# Patient Record
Sex: MALE | Race: WHITE | NOT HISPANIC OR LATINO | Employment: OTHER | URBAN - NONMETROPOLITAN AREA
[De-identification: names, ages, dates, MRNs, and addresses within clinical notes are randomized per-mention and may not be internally consistent; named-entity substitution may affect disease eponyms.]

---

## 2017-02-03 PROBLEM — I71.40 ABDOMINAL AORTIC ANEURYSM (AAA) WITHOUT RUPTURE (HCC): Status: ACTIVE | Noted: 2017-02-03

## 2018-01-23 ENCOUNTER — TELEPHONE (OUTPATIENT)
Dept: CARDIAC SURGERY | Facility: CLINIC | Age: 78
End: 2018-01-23

## 2018-01-23 NOTE — TELEPHONE ENCOUNTER
PT RECEIVED RECALL LETTER PER DR DENNEY IN [place] FOR THERE YEARLY F/U. PT VERIFIED PHONE NUMBER, ADDRESS, AND INSURANCE 1/23/2018

## 2018-01-24 DIAGNOSIS — I71.40 AAA (ABDOMINAL AORTIC ANEURYSM) WITHOUT RUPTURE (HCC): Primary | ICD-10-CM

## 2019-01-14 ENCOUNTER — TELEPHONE (OUTPATIENT)
Dept: CARDIAC SURGERY | Facility: CLINIC | Age: 79
End: 2019-01-14

## 2019-01-14 DIAGNOSIS — I71.40 ABDOMINAL AORTIC ANEURYSM (AAA) WITHOUT RUPTURE (HCC): Primary | ICD-10-CM

## 2019-04-16 DIAGNOSIS — I71.40 ABDOMINAL AORTIC ANEURYSM (AAA) WITHOUT RUPTURE (HCC): ICD-10-CM

## 2019-04-18 ENCOUNTER — OFFICE VISIT (OUTPATIENT)
Dept: CARDIAC SURGERY | Facility: CLINIC | Age: 79
End: 2019-04-18

## 2019-04-18 DIAGNOSIS — I71.40 ABDOMINAL AORTIC ANEURYSM (AAA) WITHOUT RUPTURE (HCC): Primary | ICD-10-CM

## 2019-04-18 PROCEDURE — 99212 OFFICE O/P EST SF 10 MIN: CPT | Performed by: THORACIC SURGERY (CARDIOTHORACIC VASCULAR SURGERY)

## 2019-04-19 VITALS
DIASTOLIC BLOOD PRESSURE: 55 MMHG | HEIGHT: 73 IN | SYSTOLIC BLOOD PRESSURE: 110 MMHG | BODY MASS INDEX: 33.4 KG/M2 | WEIGHT: 252 LBS | OXYGEN SATURATION: 94 % | HEART RATE: 69 BPM

## 2019-04-19 RX ORDER — GLIPIZIDE 5 MG/1
2.5 TABLET ORAL
COMMUNITY

## 2019-04-19 RX ORDER — ATORVASTATIN CALCIUM 20 MG/1
20 TABLET, FILM COATED ORAL DAILY
COMMUNITY

## 2019-04-19 RX ORDER — DOFETILIDE 0.25 MG/1
250 CAPSULE ORAL 2 TIMES DAILY
COMMUNITY

## 2019-04-19 RX ORDER — TORSEMIDE 20 MG/1
20 TABLET ORAL DAILY
COMMUNITY

## 2019-04-19 RX ORDER — PHENOL 1.4 %
600 AEROSOL, SPRAY (ML) MUCOUS MEMBRANE DAILY
COMMUNITY
End: 2022-06-09

## 2019-04-19 RX ORDER — ASPIRIN 81 MG/1
81 TABLET, CHEWABLE ORAL
COMMUNITY

## 2019-04-19 RX ORDER — PANTOPRAZOLE SODIUM 40 MG/1
40 TABLET, DELAYED RELEASE ORAL DAILY
Status: ON HOLD | COMMUNITY
End: 2021-06-16

## 2019-04-19 RX ORDER — WARFARIN SODIUM 7.5 MG/1
7.5 TABLET ORAL
COMMUNITY

## 2019-04-19 NOTE — PROGRESS NOTES
04/18/2019  Patient Information  Cuauhtemoc Blanchard                                                                                          3 [address] [facility] [place] [address]   1940  'PCP/Referring Physician'  Isaías Thornton MD  [phone]  No ref. provider found    Chief Complaint   Patient presents with   • Aortic Aneurysm     1 year follow-up with results from u/s abdominal aorta       History of Present Illness:  Mr. Blanchard returns today for follow up of his abdominal aortic aneurysm.  Since last visit, he has been doing well.  He denies any abdominal pain or discomfort.  He denies any back pain.  He is on oxygen 24/7.  He is seeing Dr. Thornton on a regular basis.    Patient Active Problem List   Diagnosis   • Aneurysm of abdominal aorta (CMS/HCC)   • Lung nodule   • CAD (coronary artery disease)   • Diabetes mellitus (CMS/HCC)   • Hypertension   • H/O: varicose veins   • Abdominal aortic aneurysm (AAA) without rupture (CMS/HCC)     Past Medical History:   Diagnosis Date   • Aneurysm of abdominal aorta (CMS/HCC)    • Arrhythmia    • Benign neoplasm of skin    • CAD (coronary artery disease)    • Diabetes mellitus (CMS/HCC)    • H/O: varicose veins    • Hypertension    • Lung nodule    • Pneumonia      Past Surgical History:   Procedure Laterality Date   • ABDOMINAL AORTIC ANEURYSM REPAIR WITH ENDOGRAFT  03/28/2014   • CARDIAC VALVE REPLACEMENT     • CHOLECYSTECTOMY     • CORONARY ARTERY BYPASS GRAFT         Current Outpatient Medications:   •  amLODIPine (NORVASC) 5 MG tablet, Take 1 tablet by mouth Daily., Disp: , Rfl:   •  aspirin 81 MG chewable tablet, Chew 81 mg., Disp: , Rfl:   •  atorvastatin (LIPITOR) 20 MG tablet, Take 20 mg by mouth Daily., Disp: , Rfl:   •  calcium carbonate (OS-GLORIA) 600 MG tablet, Take 600 mg by mouth Daily., Disp: , Rfl:   •  carvedilol (COREG) 25 MG tablet, Take 1 tablet by mouth 2 (Two) Times a Day., Disp: , Rfl:   •  DOCUSATE SODIUM PO, Take 100 mg by mouth., Disp: , Rfl:   •   dofetilide (TIKOSYN) 250 MCG capsule, Take 250 mcg by mouth 2 (Two) Times a Day., Disp: , Rfl:   •  finasteride (PROSCAR) 5 MG tablet, Take 1 tablet by mouth Daily., Disp: , Rfl:   •  glipiZIDE (GLUCOTROL) 5 MG tablet, Take 5 mg by mouth 2 (Two) Times a Day Before Meals., Disp: , Rfl:   •  pantoprazole (PROTONIX) 40 MG EC tablet, Take 40 mg by mouth Daily., Disp: , Rfl:   •  potassium chloride (K-DUR) 10 MEQ CR tablet, Take 10 mEq by mouth., Disp: , Rfl:   •  tamsulosin (FLOMAX) 0.4 MG capsule 24 hr capsule, Take 1 capsule by mouth 2 (Two) Times a Day., Disp: , Rfl:   •  torsemide (DEMADEX) 20 MG tablet, Take 20 mg by mouth., Disp: , Rfl:   •  warfarin (COUMADIN) 7.5 MG tablet, Take 7.5 mg by mouth Daily. 7 mg daily except Thursday and - take 8mg., Disp: , Rfl:   •  cholestyramine (QUESTRAN) 4 G packet, , Disp: , Rfl:   •  furosemide (LASIX) 20 MG tablet, Take 20 mg by mouth Daily., Disp: , Rfl:   •  JANTOVEN 4 MG tablet, Take 2 tablets by mouth Daily., Disp: , Rfl:   •  losartan (COZAAR) 50 MG tablet, Take 1 tablet by mouth Daily., Disp: , Rfl:   •  metFORMIN (GLUCOPHAGE) 500 MG tablet, Take 1 tablet by mouth Daily., Disp: , Rfl:   Allergies   Allergen Reactions   • Dabigatran Other (See Comments)     GI Bleed   • Pradaxa [Dabigatran Etexilate Mesylate] Other (See Comments)     GI bleed   • Iodixanol Rash   • Povidone Iodine Rash and Hives     Bleeding      Social History     Socioeconomic History   • Marital status:      Spouse name: Not on file   • Number of children: 3   • Years of education: Not on file   • Highest education level: Not on file   Occupational History   • 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-     Employer: RETIRED   Tobacco Use   • Smoking status: Former Smoker     Packs/day: 0.75     Years: 30.00     Pack years: 22.50     Types: Cigarettes     Last attempt to quit:      Years since quittin.3   • Smokeless tobacco: Never Used   Substance and Sexual Activity   • Alcohol use: No   •  "Drug use: No   Social History Narrative    Lives in [facility] with wife     Family History   Problem Relation Age of Onset   • Coronary artery disease Mother    • Diabetes Mother    • Other Father      Review of Systems   Constitution: Negative for chills, fever, malaise/fatigue, night sweats and weight loss.   HENT: Negative for hearing loss, odynophagia and sore throat.    Cardiovascular: Positive for dyspnea on exertion. Negative for chest pain, leg swelling, orthopnea and palpitations.   Respiratory: Positive for shortness of breath. Negative for cough and hemoptysis.    Endocrine: Negative for cold intolerance, heat intolerance, polydipsia, polyphagia and polyuria.   Hematologic/Lymphatic: Bruises/bleeds easily.   Skin: Negative for itching and rash.   Musculoskeletal: Positive for muscle cramps. Negative for joint pain, joint swelling and myalgias.   Gastrointestinal: Negative for abdominal pain, constipation, diarrhea, hematemesis, hematochezia, melena, nausea and vomiting.   Genitourinary: Positive for bladder incontinence. Negative for dysuria, frequency and hematuria.   Neurological: Positive for light-headedness. Negative for focal weakness, headaches, numbness and seizures.   Psychiatric/Behavioral: Negative for suicidal ideas.   All other systems reviewed and are negative.    Vitals:    04/19/19 0837   BP: 110/55   BP Location: Left arm   Patient Position: Sitting   Pulse: 69   SpO2: 94%   Weight: 114 kg (252 lb)   Height: 185.4 cm (73\")      Physical Exam  NECK:     No masses.  LUNGS:     Decreased in the bases without rales or rhonchi.  CARDIOVASCULAR:   Regular rhythm and rate without murmur, rub or gallop.  There are no carotid bruits.   GI:    Abdomen is soft and nontender.    Assessment/Plan:  Mr. Blanchard appears to be stable.  I have obtained and reviewed his ultrasound.  I concur with his aorta being about 5 cm, which appears to be stable.  We will see him back in one year with an abdominal " ultrasound.    Patient Active Problem List   Diagnosis   • Aneurysm of abdominal aorta (CMS/HCC)   • Lung nodule   • CAD (coronary artery disease)   • Diabetes mellitus (CMS/HCC)   • Hypertension   • H/O: varicose veins   • Abdominal aortic aneurysm (AAA) without rupture (CMS/HCC)     CC: MD Daisy Perry transcribing on behalf of Adam Duarte MD dictating.

## 2020-04-16 ENCOUNTER — TELEPHONE (OUTPATIENT)
Dept: CARDIAC SURGERY | Facility: CLINIC | Age: 80
End: 2020-04-16

## 2021-04-14 ENCOUNTER — TELEPHONE (OUTPATIENT)
Dept: CARDIAC SURGERY | Facility: CLINIC | Age: 81
End: 2021-04-14

## 2021-04-14 DIAGNOSIS — I71.40 ABDOMINAL AORTIC ANEURYSM (AAA) WITHOUT RUPTURE (HCC): Primary | ICD-10-CM

## 2021-04-14 NOTE — TELEPHONE ENCOUNTER
This pt was due for a 1 yr f/u and AAA US with Dr. Duarte last year but cancelled due to COVID-19. He would like to set up a f/u this year. I verified his demo and he stated he is normally seen in [place].

## 2021-05-14 DIAGNOSIS — I71.30 RUPTURED ABDOMINAL AORTIC ANEURYSM (AAA) (HCC): Primary | ICD-10-CM

## 2021-05-18 ENCOUNTER — TELEPHONE (OUTPATIENT)
Dept: CARDIAC SURGERY | Facility: CLINIC | Age: 81
End: 2021-05-18

## 2021-05-18 NOTE — TELEPHONE ENCOUNTER
Maida Mcgregor, Hannah Low  Pt's aneurysm has grown on aortic US s/p EVAR.  Seeing Dr. Duarte on 5/20/21.  He needs to have a CTA abd/pelvis to evaluate for endoleak.  Is there anyway to get that done before his appt?     Thanks,   Maida             Per message 05/12/21

## 2021-05-24 ENCOUNTER — OFFICE VISIT (OUTPATIENT)
Dept: CARDIAC SURGERY | Facility: CLINIC | Age: 81
End: 2021-05-24

## 2021-05-24 VITALS
WEIGHT: 248 LBS | HEIGHT: 72 IN | TEMPERATURE: 97.3 F | DIASTOLIC BLOOD PRESSURE: 70 MMHG | SYSTOLIC BLOOD PRESSURE: 150 MMHG | HEART RATE: 53 BPM | BODY MASS INDEX: 33.59 KG/M2

## 2021-05-24 DIAGNOSIS — Z00.6 EXAMINATION FOR NORMAL COMPARISON FOR CLINICAL RESEARCH: Primary | ICD-10-CM

## 2021-05-24 DIAGNOSIS — I71.40 ABDOMINAL AORTIC ANEURYSM (AAA) WITHOUT RUPTURE (HCC): Primary | ICD-10-CM

## 2021-05-24 PROCEDURE — 99212 OFFICE O/P EST SF 10 MIN: CPT | Performed by: THORACIC SURGERY (CARDIOTHORACIC VASCULAR SURGERY)

## 2021-05-24 RX ORDER — CHOLECALCIFEROL (VITAMIN D3) 125 MCG
CAPSULE ORAL DAILY
COMMUNITY

## 2021-05-24 NOTE — PROGRESS NOTES
05/24/2021  Patient Information  Cuauhtemoc Blanchard                                                                                          [address] [place] [address]   1940  'PCP/Referring Physician'  Isaías Thornton MD  [phone]  No ref. provider found    Chief Complaint   Patient presents with   • Follow-up     FU with CTA Abd / Pelvis for AAA       History of Present Illness:   The patient is an 81-year-old male who returns today for follow-up of an abdominal aortic aneurysm.  Its been approximately a year and a half since I have seen him.  His most recent CT scan reveals his aneurysm has grown from 5.7 to 6.1 cm.  No definite endoleak can be detected by the radiologist.      Patient Active Problem List   Diagnosis   • Aneurysm of abdominal aorta (CMS/HCC)   • Lung nodule   • CAD (coronary artery disease)   • Diabetes mellitus (CMS/HCC)   • Hypertension   • H/O: varicose veins   • Abdominal aortic aneurysm (AAA) without rupture (CMS/HCC)     Past Medical History:   Diagnosis Date   • Aneurysm of abdominal aorta (CMS/HCC)    • Arrhythmia    • Benign neoplasm of skin    • CAD (coronary artery disease)    • Diabetes mellitus (CMS/HCC)    • H/O: varicose veins    • Hypertension    • Lung nodule    • Pneumonia      Past Surgical History:   Procedure Laterality Date   • ABDOMINAL AORTIC ANEURYSM REPAIR WITH ENDOGRAFT  03/28/2014   • CARDIAC VALVE REPLACEMENT     • CHOLECYSTECTOMY     • CORONARY ARTERY BYPASS GRAFT         Current Outpatient Medications:   •  aspirin 81 MG chewable tablet, Chew 81 mg., Disp: , Rfl:   •  atorvastatin (LIPITOR) 20 MG tablet, Take 20 mg by mouth Daily., Disp: , Rfl:   •  carvedilol (COREG) 25 MG tablet, Take 1 tablet by mouth 2 (Two) Times a Day., Disp: , Rfl:   •  Cholecalciferol (Vitamin D3) 50 MCG (2000 UT) tablet, Take  by mouth Daily., Disp: , Rfl:   •  dofetilide (TIKOSYN) 250 MCG capsule, Take 250 mcg by mouth 2 (Two) Times a Day., Disp: , Rfl:   •  finasteride  (PROSCAR) 5 MG tablet, Take 1 tablet by mouth Daily., Disp: , Rfl:   •  glipiZIDE (GLUCOTROL) 5 MG tablet, Take 5 mg by mouth 2 (Two) Times a Day Before Meals., Disp: , Rfl:   •  potassium chloride (K-DUR) 10 MEQ CR tablet, Take 10 mEq by mouth., Disp: , Rfl:   •  torsemide (DEMADEX) 20 MG tablet, Take 20 mg by mouth., Disp: , Rfl:   •  warfarin (COUMADIN) 7.5 MG tablet, Take 7.5 mg by mouth Daily. 7 mg on Monday, and 6 mg the other 6 days, Disp: , Rfl:   •  amLODIPine (NORVASC) 5 MG tablet, Take 1 tablet by mouth Daily., Disp: , Rfl:   •  calcium carbonate (OS-GLORIA) 600 MG tablet, Take 600 mg by mouth Daily., Disp: , Rfl:   •  cholestyramine (QUESTRAN) 4 G packet, , Disp: , Rfl:   •  DOCUSATE SODIUM PO, Take 100 mg by mouth., Disp: , Rfl:   •  furosemide (LASIX) 20 MG tablet, Take 20 mg by mouth Daily., Disp: , Rfl:   •  JANTOVEN 4 MG tablet, Take 2 tablets by mouth Daily., Disp: , Rfl:   •  losartan (COZAAR) 50 MG tablet, Take 1 tablet by mouth Daily., Disp: , Rfl:   •  metFORMIN (GLUCOPHAGE) 500 MG tablet, Take 1 tablet by mouth Daily., Disp: , Rfl:   •  pantoprazole (PROTONIX) 40 MG EC tablet, Take 40 mg by mouth Daily., Disp: , Rfl:   •  tamsulosin (FLOMAX) 0.4 MG capsule 24 hr capsule, Take 1 capsule by mouth 2 (Two) Times a Day., Disp: , Rfl:   Allergies   Allergen Reactions   • Dabigatran Other (See Comments)     GI Bleed   • Pradaxa [Dabigatran Etexilate Mesylate] Other (See Comments)     GI bleed   • Iodixanol Rash   • Povidone Iodine Rash and Hives     Bleeding      Social History     Socioeconomic History   • Marital status:      Spouse name: Not on file   • Number of children: 3   • Years of education: Not on file   • Highest education level: Not on file   Tobacco Use   • Smoking status: Former Smoker     Packs/day: 0.75     Years: 30.00     Pack years: 22.50     Types: Cigarettes     Quit date: 1999     Years since quittin.4   • Smokeless tobacco: Never Used   Substance and Sexual Activity  "  • Alcohol use: No   • Drug use: No     Family History   Problem Relation Age of Onset   • Coronary artery disease Mother    • Diabetes Mother    • Other Father      Review of Systems   Constitutional: Positive for malaise/fatigue. Negative for chills, fever, night sweats and weight loss.   HENT: Negative for hearing loss, odynophagia and sore throat.    Cardiovascular: Positive for dyspnea on exertion and leg swelling. Negative for chest pain, orthopnea and palpitations.   Respiratory: Positive for cough, shortness of breath and wheezing. Negative for hemoptysis.    Endocrine: Negative for cold intolerance, heat intolerance, polydipsia, polyphagia and polyuria.   Hematologic/Lymphatic: Bruises/bleeds easily.   Skin: Negative for itching and rash.   Musculoskeletal: Positive for muscle cramps. Negative for joint pain, joint swelling and myalgias.   Gastrointestinal: Negative for abdominal pain, constipation, diarrhea, hematemesis, hematochezia, melena, nausea and vomiting.   Genitourinary: Negative for dysuria, frequency and hematuria.   Neurological: Positive for dizziness. Negative for focal weakness, headaches, numbness and seizures.   Psychiatric/Behavioral: Negative for suicidal ideas.   All other systems reviewed and are negative.    Vitals:    05/24/21 1106   BP: 150/70   BP Location: Right arm   Patient Position: Sitting   Pulse: 53   Temp: 97.3 °F (36.3 °C)   Weight: 112 kg (248 lb)   Height: 182.9 cm (72\")      Physical Exam  Vitals and nursing note reviewed.   Cardiovascular:      Rate and Rhythm: Normal rate and regular rhythm.      Pulses: Normal pulses.      Heart sounds: Normal heart sounds.   Pulmonary:      Effort: Pulmonary effort is normal.      Breath sounds: Normal breath sounds.   Abdominal:      General: Abdomen is flat. Bowel sounds are normal.      Palpations: Abdomen is soft.   Musculoskeletal:      Cervical back: Normal range of motion and neck supple.         The ROS, past medical " history, surgical history, family history, social history and vitals were reviewed by myself and corrected as needed.      Assessment/Plan:   The patient is an 81-year-old  male who I, previously, performed an endograft in 2014.  The patient has done well and continues to feel well, but the aneurysm has enlarged.  I personally obtained and reviewed the CT scan.  I concur with the interpretation that the aneurysm is now approximately 6.1 cm in diameter. However, I do not see a definite endoleak.  At this point, I have reviewed the case with Dr. Huddleston in detail.  He reviewed the films as well.  We will make a group decision in regards to the plan for the patient.  He may require another arteriogram or attempted intervention on the aneurysm. I discussed that with both the patient and his grandson and answered all of their questions.    Patient Active Problem List   Diagnosis   • Aneurysm of abdominal aorta (CMS/HCC)   • Lung nodule   • CAD (coronary artery disease)   • Diabetes mellitus (CMS/HCC)   • Hypertension   • H/O: varicose veins   • Abdominal aortic aneurysm (AAA) without rupture (CMS/HCC)     CC: MD Natali Merrill editing for Adam Duarte MD      I, Adam Duarte MD, have read and agree with the editing done by Natali Ferrell, .

## 2021-05-25 ENCOUNTER — PREP FOR SURGERY (OUTPATIENT)
Dept: OTHER | Facility: HOSPITAL | Age: 81
End: 2021-05-25

## 2021-05-25 DIAGNOSIS — I97.89 TYPE II ENDOLEAK OF AORTIC GRAFT: Primary | ICD-10-CM

## 2021-05-25 RX ORDER — SODIUM CHLORIDE 9 MG/ML
100 INJECTION, SOLUTION INTRAVENOUS CONTINUOUS
Status: CANCELLED | OUTPATIENT
Start: 2021-05-25

## 2021-05-25 RX ORDER — SODIUM CHLORIDE 0.9 % (FLUSH) 0.9 %
3 SYRINGE (ML) INJECTION EVERY 12 HOURS SCHEDULED
Status: CANCELLED | OUTPATIENT
Start: 2021-05-25

## 2021-05-25 RX ORDER — SODIUM CHLORIDE 0.9 % (FLUSH) 0.9 %
1-10 SYRINGE (ML) INJECTION AS NEEDED
Status: CANCELLED | OUTPATIENT
Start: 2021-05-25

## 2021-06-04 PROBLEM — I97.89 TYPE II ENDOLEAK OF AORTIC GRAFT: Status: ACTIVE | Noted: 2021-06-04

## 2021-06-16 ENCOUNTER — HOSPITAL ENCOUNTER (OUTPATIENT)
Facility: HOSPITAL | Age: 81
Discharge: HOME | End: 2021-06-16
Attending: RADIOLOGY | Admitting: RADIOLOGY

## 2021-06-16 VITALS
BODY MASS INDEX: 33.51 KG/M2 | TEMPERATURE: 97.5 F | WEIGHT: 247.4 LBS | OXYGEN SATURATION: 100 % | RESPIRATION RATE: 16 BRPM | HEIGHT: 72 IN | HEART RATE: 60 BPM | SYSTOLIC BLOOD PRESSURE: 121 MMHG | DIASTOLIC BLOOD PRESSURE: 57 MMHG

## 2021-06-16 DIAGNOSIS — I97.89 TYPE II ENDOLEAK OF AORTIC GRAFT: ICD-10-CM

## 2021-06-16 LAB
ANION GAP SERPL CALCULATED.3IONS-SCNC: 8 MMOL/L (ref 5–15)
BUN SERPL-MCNC: 28 MG/DL (ref 8–23)
BUN/CREAT SERPL: 20.1 (ref 7–25)
CALCIUM SPEC-SCNC: 9.1 MG/DL (ref 8.6–10.5)
CHLORIDE SERPL-SCNC: 98 MMOL/L (ref 98–107)
CO2 SERPL-SCNC: 31 MMOL/L (ref 22–29)
CREAT SERPL-MCNC: 1.39 MG/DL (ref 0.76–1.27)
DEPRECATED RDW RBC AUTO: 49.6 FL (ref 37–54)
ERYTHROCYTE [DISTWIDTH] IN BLOOD BY AUTOMATED COUNT: 15 % (ref 12.3–15.4)
GFR SERPL CREATININE-BSD FRML MDRD: 49 ML/MIN/1.73
GLUCOSE SERPL-MCNC: 103 MG/DL (ref 65–99)
HCT VFR BLD AUTO: 40.5 % (ref 37.5–51)
HGB BLD-MCNC: 12.3 G/DL (ref 13–17.7)
MCH RBC QN AUTO: 27.3 PG (ref 26.6–33)
MCHC RBC AUTO-ENTMCNC: 30.4 G/DL (ref 31.5–35.7)
MCV RBC AUTO: 89.8 FL (ref 79–97)
PLATELET # BLD AUTO: 144 10*3/MM3 (ref 140–450)
PMV BLD AUTO: 10.3 FL (ref 6–12)
POTASSIUM SERPL-SCNC: 4.1 MMOL/L (ref 3.5–5.2)
RBC # BLD AUTO: 4.51 10*6/MM3 (ref 4.14–5.8)
SODIUM SERPL-SCNC: 137 MMOL/L (ref 136–145)
WBC # BLD AUTO: 7.7 10*3/MM3 (ref 3.4–10.8)

## 2021-06-16 PROCEDURE — 36247 INS CATH ABD/L-EXT ART 3RD: CPT | Performed by: RADIOLOGY

## 2021-06-16 PROCEDURE — C1769 GUIDE WIRE: HCPCS | Performed by: RADIOLOGY

## 2021-06-16 PROCEDURE — C1889 IMPLANT/INSERT DEVICE, NOC: HCPCS | Performed by: RADIOLOGY

## 2021-06-16 PROCEDURE — 75726 ARTERY X-RAYS ABDOMEN: CPT | Performed by: RADIOLOGY

## 2021-06-16 PROCEDURE — 37242 VASC EMBOLIZE/OCCLUDE ARTERY: CPT | Performed by: RADIOLOGY

## 2021-06-16 PROCEDURE — C1889 IMPLANT/INSERT DEVICE, NOC: HCPCS

## 2021-06-16 PROCEDURE — 75716 ARTERY X-RAYS ARMS/LEGS: CPT | Performed by: RADIOLOGY

## 2021-06-16 PROCEDURE — 85027 COMPLETE CBC AUTOMATED: CPT

## 2021-06-16 PROCEDURE — 0 IODIXANOL PER 1 ML: Performed by: RADIOLOGY

## 2021-06-16 PROCEDURE — C1760 CLOSURE DEV, VASC: HCPCS | Performed by: RADIOLOGY

## 2021-06-16 PROCEDURE — 75736 ARTERY X-RAYS PELVIS: CPT

## 2021-06-16 PROCEDURE — 25010000002 MIDAZOLAM PER 1 MG: Performed by: RADIOLOGY

## 2021-06-16 PROCEDURE — 75705 ARTERY X-RAYS SPINE: CPT | Performed by: RADIOLOGY

## 2021-06-16 PROCEDURE — 36245 INS CATH ABD/L-EXT ART 1ST: CPT | Performed by: RADIOLOGY

## 2021-06-16 PROCEDURE — C1894 INTRO/SHEATH, NON-LASER: HCPCS | Performed by: RADIOLOGY

## 2021-06-16 PROCEDURE — 80048 BASIC METABOLIC PNL TOTAL CA: CPT

## 2021-06-16 PROCEDURE — C1887 CATHETER, GUIDING: HCPCS | Performed by: RADIOLOGY

## 2021-06-16 PROCEDURE — 25010000003 LIDOCAINE 1 % SOLUTION: Performed by: RADIOLOGY

## 2021-06-16 PROCEDURE — 75710 ARTERY X-RAYS ARM/LEG: CPT | Performed by: RADIOLOGY

## 2021-06-16 PROCEDURE — 25010000002 FENTANYL CITRATE (PF) 50 MCG/ML SOLUTION: Performed by: RADIOLOGY

## 2021-06-16 PROCEDURE — 75774 ARTERY X-RAY EACH VESSEL: CPT | Performed by: RADIOLOGY

## 2021-06-16 DEVICE — IMPLANTABLE DEVICE: Type: IMPLANTABLE DEVICE | Status: FUNCTIONAL

## 2021-06-16 DEVICE — COIL AXIUM PRIME 3D XSFT 3MM 6CM: Type: IMPLANTABLE DEVICE | Status: FUNCTIONAL

## 2021-06-16 RX ORDER — CEPHALEXIN 500 MG/1
500 CAPSULE ORAL 2 TIMES DAILY
COMMUNITY
End: 2022-06-09

## 2021-06-16 RX ORDER — SODIUM CHLORIDE 9 MG/ML
100 INJECTION, SOLUTION INTRAVENOUS CONTINUOUS
Status: DISCONTINUED | OUTPATIENT
Start: 2021-06-16 | End: 2021-06-16 | Stop reason: HOSPADM

## 2021-06-16 RX ORDER — LIDOCAINE HYDROCHLORIDE 10 MG/ML
INJECTION, SOLUTION INFILTRATION; PERINEURAL AS NEEDED
Status: DISCONTINUED | OUTPATIENT
Start: 2021-06-16 | End: 2021-06-16 | Stop reason: HOSPADM

## 2021-06-16 RX ORDER — SODIUM CHLORIDE 0.9 % (FLUSH) 0.9 %
1-10 SYRINGE (ML) INJECTION AS NEEDED
Status: DISCONTINUED | OUTPATIENT
Start: 2021-06-16 | End: 2021-06-16 | Stop reason: HOSPADM

## 2021-06-16 RX ORDER — SODIUM CHLORIDE 0.9 % (FLUSH) 0.9 %
10 SYRINGE (ML) INJECTION AS NEEDED
Status: DISCONTINUED | OUTPATIENT
Start: 2021-06-16 | End: 2021-06-16 | Stop reason: HOSPADM

## 2021-06-16 RX ORDER — SODIUM CHLORIDE 0.9 % (FLUSH) 0.9 %
3 SYRINGE (ML) INJECTION EVERY 12 HOURS SCHEDULED
Status: DISCONTINUED | OUTPATIENT
Start: 2021-06-16 | End: 2021-06-16 | Stop reason: HOSPADM

## 2021-06-16 RX ORDER — MIDAZOLAM HYDROCHLORIDE 1 MG/ML
INJECTION INTRAMUSCULAR; INTRAVENOUS AS NEEDED
Status: DISCONTINUED | OUTPATIENT
Start: 2021-06-16 | End: 2021-06-16 | Stop reason: HOSPADM

## 2021-06-16 RX ORDER — FAMOTIDINE 40 MG/1
40 TABLET, FILM COATED ORAL DAILY
COMMUNITY

## 2021-06-16 RX ORDER — SODIUM CHLORIDE 9 MG/ML
75 INJECTION, SOLUTION INTRAVENOUS CONTINUOUS
Status: ACTIVE | OUTPATIENT
Start: 2021-06-16 | End: 2021-06-16

## 2021-06-16 RX ORDER — IODIXANOL 320 MG/ML
INJECTION, SOLUTION INTRAVASCULAR AS NEEDED
Status: DISCONTINUED | OUTPATIENT
Start: 2021-06-16 | End: 2021-06-16 | Stop reason: HOSPADM

## 2021-06-16 RX ORDER — FENTANYL CITRATE 50 UG/ML
INJECTION, SOLUTION INTRAMUSCULAR; INTRAVENOUS AS NEEDED
Status: DISCONTINUED | OUTPATIENT
Start: 2021-06-16 | End: 2021-06-16 | Stop reason: HOSPADM

## 2021-06-16 RX ADMIN — SODIUM CHLORIDE 100 ML/HR: 9 INJECTION, SOLUTION INTRAVENOUS at 09:25

## 2021-06-16 NOTE — DISCHARGE INSTR - APPOINTMENTS
Dr. Redd's office will be contacting you with a time and date for your next follow up appointment. They are going to talk with Dr. Duarte before scheduling your appointment to ensure that you are coming back in the correct time frame. If you have any questions or concerns please call [phone].

## 2021-06-16 NOTE — INTERVAL H&P NOTE
H&P updated. The patient was examined and the following changes are noted:  Patient is alert and oriented times x3.  Distal pulses intact.  Abdominal soft.  Antigravity strength present in the upper and lower extremities.  Informed consent was obtained.  Patient notes understanding of the procedure and willing to proceed.

## 2021-06-30 ENCOUNTER — OFFICE VISIT (OUTPATIENT)
Dept: NEUROSURGERY | Facility: CLINIC | Age: 81
End: 2021-06-30

## 2021-06-30 VITALS — HEIGHT: 72 IN | BODY MASS INDEX: 33.46 KG/M2 | TEMPERATURE: 97.6 F | WEIGHT: 247 LBS

## 2021-06-30 DIAGNOSIS — I97.89 TYPE II ENDOLEAK OF AORTIC GRAFT: Primary | ICD-10-CM

## 2021-06-30 PROCEDURE — 99024 POSTOP FOLLOW-UP VISIT: CPT | Performed by: PHYSICIAN ASSISTANT

## 2021-06-30 NOTE — PATIENT INSTRUCTIONS

## 2021-06-30 NOTE — PROGRESS NOTES
NAME: CHARITO BLANCHARD   DOS: 2021  : 1940  PCP: Isaías Thornton MD  Type of Service: Appointment via telemedicine  You have chosen to receive care through a telehealth visit.  Do you consent to use a video/audio connection for your medical care today? Yes   Mode of Transmission: Telemedicine via 2 way interactive audio telecommunication    Chief Complaint:  Follow-up      History of Present Illness: Mr. Blanchard is a 81 y.o. 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  Patient has a significant past medical history of a status post endograft repair of AAA in 2014.  Patient is on chronic anticoagulation due to cardiac valve replacement.  Patient demonstrated interval growth of the AAA sac which was concerning for a type II endoleak on CTA of the abdomen and pelvis.  Therefore, he was seen by Dr. Huddleston in consultation.  It was deemed appropriate to pursue embolization of the AAA endoleak.  Angiographic confirmed the endoleak was being filled through the right L4 lumbar radicular artery.  This was treated with several coils.  There is no evidence of residual endoleak identified.      Today, patient is seen in follow-up.  Patient notes he is doing very well.  Notes no pain or issues.  Notes the groin incision has healed appropriately.      Past Medical History:   Diagnosis Date   • Aneurysm of abdominal aorta (CMS/HCC)    • Arrhythmia    • Benign neoplasm of skin    • CAD (coronary artery disease)    • Diabetes mellitus (CMS/HCC)    • H/O: varicose veins    • Hypertension    • Lung nodule    • Neurogenic bladder    • Pneumonia        Past Surgical History:   Procedure Laterality Date   • ABDOMINAL AORTIC ANEURYSM REPAIR WITH ENDOGRAFT  2014   • CARDIAC VALVE REPLACEMENT     • CHOLECYSTECTOMY     • CORONARY ARTERY BYPASS GRAFT     • KEARNEY CATHETER     • INTERVENTIONAL RADIOLOGY PROCEDURE N/A 2021    Procedure: angiogram and possible embolization AAA  endoleak;  Surgeon: Carmelo Huddleston MD;  Location: [facility] INVASIVE LOC[facility];  Service: Interventional Radiology;  Laterality: N/A;             Review of Systems   All other systems reviewed and are negative.       Medications:    Current Outpatient Medications:   •  aspirin 81 MG chewable tablet, Chew 81 mg., Disp: , Rfl:   •  atorvastatin (LIPITOR) 20 MG tablet, Take 20 mg by mouth Daily., Disp: , Rfl:   •  calcium carbonate (OS-GLORIA) 600 MG tablet, Take 600 mg by mouth Daily., Disp: , Rfl:   •  carvedilol (COREG) 25 MG tablet, Take 1 tablet by mouth 2 (Two) Times a Day., Disp: , Rfl:   •  cephalexin (KEFLEX) 500 MG capsule, Take 500 mg by mouth 2 (Two) Times a Day., Disp: , Rfl:   •  Cholecalciferol (Vitamin D3) 50 MCG (2000 UT) tablet, Take  by mouth Daily., Disp: , Rfl:   •  DOCUSATE SODIUM PO, Take 100 mg by mouth., Disp: , Rfl:   •  dofetilide (TIKOSYN) 250 MCG capsule, Take 250 mcg by mouth 2 (Two) Times a Day., Disp: , Rfl:   •  famotidine (PEPCID) 40 MG tablet, Take 40 mg by mouth Daily., Disp: , Rfl:   •  finasteride (PROSCAR) 5 MG tablet, Take 1 tablet by mouth Daily., Disp: , Rfl:   •  glipiZIDE (GLUCOTROL) 5 MG tablet, Take 2.5 mg by mouth 2 (Two) Times a Day Before Meals., Disp: , Rfl:   •  potassium chloride (K-DUR) 10 MEQ CR tablet, Take 10 mEq by mouth 2 (Two) Times a Day., Disp: , Rfl:   •  tamsulosin (FLOMAX) 0.4 MG capsule 24 hr capsule, Take 1 capsule by mouth 2 (Two) Times a Day., Disp: , Rfl:   •  torsemide (DEMADEX) 20 MG tablet, Take 20 mg by mouth 2 (Two) Times a Day., Disp: , Rfl:   •  warfarin (COUMADIN) 7.5 MG tablet, Take 7.5 mg by mouth Daily. 7 mg on Monday, and 6 mg the other 6 days, Disp: , Rfl:     Allergies:  Allergies   Allergen Reactions   • Dabigatran Other (See Comments)     GI Bleed   • Pradaxa [Dabigatran Etexilate Mesylate] Other (See Comments)     GI bleed   • Iodixanol Rash   • Povidone Iodine Rash and Hives     Bleeding        Social History     Tobacco Use   •  Smoking status: Former Smoker     Packs/day: 0.75     Years: 30.00     Pack years: 22.50     Types: Cigarettes     Quit date:      Years since quittin.5   • Smokeless tobacco: Never Used   Substance Use Topics   • Alcohol use: No   • Drug use: No       Family History   Problem Relation Age of Onset   • Coronary artery disease Mother    • Diabetes Mother    • Other Father        Review of Imaging:  No new imaging to review    Vitals:    21 1145   Temp: 97.6 °F (36.4 °C)     Body mass index is 33.5 kg/m².    Physical Exam  Neurologic Exam  Visit done via telephone due to COVID-19 epidemic.  Patient appeared alert and orientated.  Speech is fluent.    Diagnoses/Plan:    Mr. Blanchard is a 81 y.o. male is seen today status post AAA endoleak repair he had groin embolization.  Patient continues to do very well and is without any complaints.  Therefore, patient can follow-up on an as-needed basis in our office.  Did note to patient that he will need to continue to follow-up with Dr. Duarte in regards to his AAA.  Patient notes understanding and willing to proceed.      Patient's Body mass index is 33.5 kg/m². indicating that he is obese (BMI >30). Obesity-related health conditions include the following: hypertension and diabetes mellitus.    Jacqueline Adkins PA-C    Televisit total time 5 minutes

## 2021-09-07 DIAGNOSIS — I71.40 ABDOMINAL AORTIC ANEURYSM (AAA) WITHOUT RUPTURE (HCC): Primary | ICD-10-CM

## 2021-10-25 ENCOUNTER — TELEPHONE (OUTPATIENT)
Dept: CARDIAC SURGERY | Facility: CLINIC | Age: 81
End: 2021-10-25

## 2021-10-25 NOTE — TELEPHONE ENCOUNTER
PT COULDN'T HAVE TEST DUE TO HIS KIDNEY FUNCTION. PT REQUESTS THAT THEY HAVE AN APPT ON WED ONLY. THIS IS THE ONLY DAY THAT THEY CAN BE HERE.

## 2021-12-08 ENCOUNTER — OFFICE VISIT (OUTPATIENT)
Dept: CARDIAC SURGERY | Facility: CLINIC | Age: 81
End: 2021-12-08

## 2021-12-08 VITALS
TEMPERATURE: 97.5 F | HEART RATE: 75 BPM | SYSTOLIC BLOOD PRESSURE: 118 MMHG | WEIGHT: 245 LBS | DIASTOLIC BLOOD PRESSURE: 72 MMHG | HEIGHT: 72 IN | OXYGEN SATURATION: 95 % | BODY MASS INDEX: 33.18 KG/M2

## 2021-12-08 DIAGNOSIS — I71.40 ABDOMINAL AORTIC ANEURYSM (AAA) WITHOUT RUPTURE (HCC): Primary | ICD-10-CM

## 2021-12-08 DIAGNOSIS — I97.89 TYPE II ENDOLEAK OF AORTIC GRAFT: ICD-10-CM

## 2021-12-08 PROCEDURE — 99214 OFFICE O/P EST MOD 30 MIN: CPT | Performed by: NURSE PRACTITIONER

## 2021-12-08 NOTE — PROGRESS NOTES
[facility] Cardiothoracic Surgery Office Follow Up Note     Date of Encounter: 2021     Name: Cuauhtemoc Blanchard  : 1940     Referred By: No ref. provider found  PCP: Isaías Thornton MD    Chief Complaint:    Chief Complaint   Patient presents with   • Aortic Aneurysm     4 month follow up after CT abd/pel        Subjective      History of Present Illness:    Cuauhtemoc Blanchard is a 81 y.o. male former smoker with history of HTN, HLD on statin therapy, non-insulin-dependent DM, CAD s/p CABG, VHD s/p valve replacement on Coumadin therapy, and AAA s/p EVAR in  by Dr. Duarte.  Patient last seen in clinic 2021 by Dr. Duarte with increase in native aneurysmal sac from 5.1 cm to almost 6 cm with concern for type II endoleak s/p angiogram with embolization with several coils of right L4 lumbar radicular artery on 2021 with Dr Huddleston. He presents today in follow-up with new imaging. He has no acute complaints today denying any unusual abdominal or back pain and reporting good blood pressure control.     Review of Systems:  Review of Systems   Constitutional: Negative for chills, decreased appetite, diaphoresis, fever, malaise/fatigue, night sweats, weight gain and weight loss.   HENT: Negative for hoarse voice.    Eyes: Negative for blurred vision, double vision and visual disturbance.   Cardiovascular: Negative for chest pain, claudication, dyspnea on exertion, irregular heartbeat, leg swelling, near-syncope, orthopnea, palpitations, paroxysmal nocturnal dyspnea and syncope.   Respiratory: Negative for cough, hemoptysis, shortness of breath, sputum production and wheezing.    Hematologic/Lymphatic: Negative for adenopathy and bleeding problem. Does not bruise/bleed easily.   Skin: Negative for color change, nail changes, poor wound healing and rash.   Musculoskeletal: Negative for back pain, falls and muscle cramps.   Gastrointestinal: Negative for abdominal pain, dysphagia and heartburn.    Genitourinary: Negative for flank pain.   Neurological: Negative for brief paralysis, disturbances in coordination, dizziness, focal weakness, headaches, light-headedness, loss of balance, numbness, paresthesias, sensory change, vertigo and weakness.   Psychiatric/Behavioral: Negative for depression and suicidal ideas.   Allergic/Immunologic: Negative for persistent infections.       I have reviewed the following portions of the patient's history: allergies, current medications, past family history, past medical history, past social history, past surgical history and problem list and confirm it's accurate.    Allergies:  Allergies   Allergen Reactions   • Dabigatran Other (See Comments)     GI Bleed   • Pradaxa [Dabigatran Etexilate Mesylate] Other (See Comments)     GI bleed   • Iodixanol Rash   • Povidone Iodine Rash and Hives     Bleeding        Medications:      Current Outpatient Medications:   •  aspirin 81 MG chewable tablet, Chew 81 mg., Disp: , Rfl:   •  atorvastatin (LIPITOR) 20 MG tablet, Take 20 mg by mouth Daily., Disp: , Rfl:   •  calcium carbonate (OS-GLORIA) 600 MG tablet, Take 600 mg by mouth Daily., Disp: , Rfl:   •  carvedilol (COREG) 25 MG tablet, Take 1 tablet by mouth 2 (Two) Times a Day., Disp: , Rfl:   •  cephalexin (KEFLEX) 500 MG capsule, Take 500 mg by mouth 2 (Two) Times a Day., Disp: , Rfl:   •  Cholecalciferol (Vitamin D3) 50 MCG (2000 UT) tablet, Take  by mouth Daily., Disp: , Rfl:   •  DOCUSATE SODIUM PO, Take 100 mg by mouth., Disp: , Rfl:   •  dofetilide (TIKOSYN) 250 MCG capsule, Take 250 mcg by mouth 2 (Two) Times a Day., Disp: , Rfl:   •  famotidine (PEPCID) 40 MG tablet, Take 40 mg by mouth Daily., Disp: , Rfl:   •  finasteride (PROSCAR) 5 MG tablet, Take 1 tablet by mouth Daily., Disp: , Rfl:   •  glipiZIDE (GLUCOTROL) 5 MG tablet, Take 2.5 mg by mouth 2 (Two) Times a Day Before Meals., Disp: , Rfl:   •  potassium chloride (K-DUR) 10 MEQ CR tablet, Take 10 mEq by mouth 2 (Two)  "Times a Day., Disp: , Rfl:   •  tamsulosin (FLOMAX) 0.4 MG capsule 24 hr capsule, Take 1 capsule by mouth 2 (Two) Times a Day., Disp: , Rfl:   •  torsemide (DEMADEX) 20 MG tablet, Take 20 mg by mouth 2 (Two) Times a Day., Disp: , Rfl:   •  warfarin (COUMADIN) 7.5 MG tablet, Take 7.5 mg by mouth Daily. 7 mg on Monday, and 6 mg the other 6 days, Disp: , Rfl:     History:   Past Medical History:   Diagnosis Date   • Aneurysm of abdominal aorta (HCC)    • Arrhythmia    • Benign neoplasm of skin    • CAD (coronary artery disease)    • Diabetes mellitus (HCC)    • H/O: varicose veins    • Hypertension    • Lung nodule    • Neurogenic bladder    • Pneumonia        Past Surgical History:   Procedure Laterality Date   • ABDOMINAL AORTIC ANEURYSM REPAIR WITH ENDOGRAFT  2014   • CARDIAC VALVE REPLACEMENT     • CHOLECYSTECTOMY     • CORONARY ARTERY BYPASS GRAFT     • KEARNEY CATHETER     • INTERVENTIONAL RADIOLOGY PROCEDURE N/A 2021    Procedure: angiogram and possible embolization AAA endoleak;  Surgeon: Carmelo Huddleston MD;  Location: [facility] INVASIVE LOCATION;  Service: Interventional Radiology;  Laterality: N/A;       Social History     Socioeconomic History   • Marital status:    • Number of children: 3   Tobacco Use   • Smoking status: Former Smoker     Packs/day: 0.75     Years: 30.00     Pack years: 22.50     Types: Cigarettes     Quit date:      Years since quittin.9   • Smokeless tobacco: Never Used   Substance and Sexual Activity   • Alcohol use: No   • Drug use: No        Family History   Problem Relation Age of Onset   • Coronary artery disease Mother    • Diabetes Mother    • Other Father        Objective   Physical Exam:  Vitals:    21 1326   BP: 118/72   BP Location: Left arm   Patient Position: Sitting   Pulse: 75   Temp: 97.5 °F (36.4 °C)   SpO2: 95%   Weight: 111 kg (245 lb)   Height: 182.9 cm (72\")      Body mass index is 33.23 kg/m².    Physical Exam    Imaging/Labs:  CTA " abdomen and pelvis ([facility])-Result date 12/3/2021  Redemonstration of the abdominal aortic aneurysm measuring up to 5 cm grossly unchanged in size compared to prior.  Aortobiiliac stent graft appears patent.  There is limited evaluation of the lumen due to embolism material.  Within these limits, there is no definitive evidence of endoleak.  There is thickening of the anterior wall of the bladder of uncertain significance may represent cystitis.  Redemonstration of ill-defined infiltrate or atelectasis in the lung base may represent partially visualized round atelectasis    CTA abdomen and pelvis ([facility])-Result date 5/19/2021  Aneurysmal dilatation of the infrarenal abdominal aorta measuring up to 5.7 cm with aortobiiliac stent in place.  No definite evidence of endoleak.  There is mild narrowing of the origin of the celiac axis.  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  Cirrhotic appearance of the liver with linear abnormalities in the mid abdomen possibly representing varices.  Minimal distention of the bladder with diffuse bladder wall thickening could be due to under distention, however cystitis could have similar appearance.  Nodular focus along the pleura of the left lung base measuring up to 7 cm with linear foci extending from the region may represent round atelectasis.  Dedicated chest imaging could be considered for further evaluation.     Ultrasound aorta abdominal ([facility])-Result date 5/11/2021  Redemonstration of aneurysmal dilatation of the abdominal aorta now measuring up to 5.7 x 4.6 cm in the mid abdominal aorta and 6.1 cm distally.  There is no evidence of periaortic fluid collections.    Ultrasound aorta abdominal ([facility])-Result date 3/21/2019  The course and caliber of the abdominal aorta demonstrates fusiform dilatation of the distal abdominal aorta that measures a 10 maximal dimension of  approximately 5.1 cm.  A stent graft is present appears patent.  The visualized portion of the aortic bifurcation the bilateral common iliac arteries is within normal limits.  There is no evidence of periaortic fluid collections.    Assessment / Plan      Assessment / Plan:  Diagnoses and all orders for this visit:    1. Abdominal aortic aneurysm (AAA) without rupture s/p EVAR 2014 (Primary)  -     CT Angiogram Abdomen Pelvis; Future    2. 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 Duarte.    · 5/2021 imaging with increase in native aneurysmal sac from 5.1 cm to almost 6 cm with concern for type II endoleak   · S/p angiogram with embolization with several coils of right L4 lumbar radicular artery on 6/16/2021 with Dr Huddleston.  · He presents today in follow-up with new imaging with no acute complaints today denying any unusual abdominal or back pain and reporting good blood pressure control  · Imaging personally reviewed with continued measurement of ~ 5.7cm, no interval growth in native aneurismal sac. Difficult to ascertain about endoleak with coil artifact. There is a change in shape of the most proximal part of aneurysm just infrarenal. Will plan to repeat scan in 6 months to ensure stability prior to moving to annual surveillance.    · Pt has difficulty traveling with chronic bladder morse cath and transportation issues. Pending imaging pt to follow-up via telehealth appointment    Follow Up:   Return in about 6 months (around 6/8/2022) for Imaging next visit.   Or sooner for any further concerns or worsening sign and symptoms. If unable to reach us in the office please dial 911 or go to the nearest emergency department.      So BETTENCOURT  [facility] Cardiothoracic Surgery    Time Spent: I spent 30 minutes caring for Cuauhtemoc on this date of service. This time includes time spent by me in the following  activities: preparing for the visit, reviewing tests, obtaining and/or reviewing a separately obtained history, performing a medically appropriate examination and/or evaluation, counseling and educating the patient/family/caregiver, ordering medications, tests, or procedures, documenting information in the medical record, independently interpreting results and communicating that information with the patient/family/caregiver and care coordination.

## 2021-12-10 DIAGNOSIS — Z00.6 EXAMINATION FOR NORMAL COMPARISON FOR CLINICAL RESEARCH: Primary | ICD-10-CM

## 2022-05-31 DIAGNOSIS — I71.40 ABDOMINAL AORTIC ANEURYSM (AAA) WITHOUT RUPTURE: ICD-10-CM

## 2022-05-31 DIAGNOSIS — I97.89 TYPE II ENDOLEAK OF AORTIC GRAFT: ICD-10-CM

## 2022-06-09 ENCOUNTER — OFFICE VISIT (OUTPATIENT)
Dept: CARDIAC SURGERY | Facility: CLINIC | Age: 82
End: 2022-06-09

## 2022-06-09 DIAGNOSIS — R91.1 LUNG NODULE: ICD-10-CM

## 2022-06-09 DIAGNOSIS — I71.40 ABDOMINAL AORTIC ANEURYSM (AAA) WITHOUT RUPTURE: Primary | ICD-10-CM

## 2022-06-09 PROBLEM — Z79.01 LONG TERM (CURRENT) USE OF ANTICOAGULANTS: Status: ACTIVE | Noted: 2022-06-09

## 2022-06-09 PROBLEM — Z85.828 HISTORY OF MALIGNANT NEOPLASM OF SKIN: Status: ACTIVE | Noted: 2022-06-09

## 2022-06-09 PROBLEM — H90.6 MIXED CONDUCTIVE AND SENSORINEURAL HEARING LOSS, BILATERAL: Status: ACTIVE | Noted: 2022-06-09

## 2022-06-09 PROBLEM — I77.9 DISORDER OF AORTA (HCC): Status: ACTIVE | Noted: 2022-06-09

## 2022-06-09 PROCEDURE — 99443 PR PHYS/QHP TELEPHONE EVALUATION 21-30 MIN: CPT | Performed by: NURSE PRACTITIONER

## 2022-06-09 NOTE — PROGRESS NOTES
Telehealth E-Visit      Patient Name: Cuauhtemoc Blanchard  : 1940   MRN: [id number]     The patient has consented to a Telehealth Visit.     Chief Complaint:    Chief Complaint   Patient presents with   • Follow-up     6 MO FU with CTA Abd / Pelvis for AAA       History of Present Illness:   Cuauhtemoc Blanchard is a 82 y.o. male former smoker with history of oxygen dependent COPD, inflammatory pulmonary nodules, HTN, HLD on statin therapy, non-insulin-dependent DM, CAD s/p CABG, VHD s/p aortic valve replacement 2017 at [facility] on Coumadin therapy, and AAA s/p EVAR in  by Dr. Duarte.  Surveillance imaging from 2021 with increase in native aneurysmal sac from 5.1 cm to almost 6 cm with concern for type II endoleak s/p angiogram with embolization with several coils of right L4 lumbar radicular artery on 2021 with Dr Huddleston. He was last seen 2021 with stable CT and presents today with surveillance imaging. He has no acute complaints today denying any unusual abdominal or back pain and reporting good blood pressure control. He has difficulty traveling secondary to chronic bladder morse cath and transportation issues. Of note patient has had a history of pulmonary nodules as far back as  with Dr Duarte and as large as 1.9cm. He had a PET scan 3/2014 showing these as non-hypermetabolic with Dr Duarte states appears to be inflammatory nodules. He is not currently followed by pulmonology and it does not appear he has had any dedicated CT imaging of his chest since that time. His CT abdomen picked up a LLL infiltrate and nodule that appears enlarged to 2.1-3.1cm (radiology discrepancy).       Subjective      Review of Systems:   Review of Systems   Constitutional: Negative for chills, decreased appetite, diaphoresis, fever, malaise/fatigue, night sweats, weight gain and weight loss.   HENT: Negative for hoarse voice.    Eyes: Negative for blurred vision, double vision and visual disturbance.    Cardiovascular: Positive for dyspnea on exertion and leg swelling. Negative for chest pain, claudication, irregular heartbeat, near-syncope, orthopnea, palpitations, paroxysmal nocturnal dyspnea and syncope.   Respiratory: Positive for cough, shortness of breath and wheezing. Negative for hemoptysis and sputum production.    Hematologic/Lymphatic: Negative for adenopathy and bleeding problem. Bruises/bleeds easily.   Skin: Negative for color change, nail changes, poor wound healing and rash.   Musculoskeletal: Negative for back pain, falls and muscle cramps.   Gastrointestinal: Negative for abdominal pain, dysphagia and heartburn.   Genitourinary: Negative for flank pain.   Neurological: Positive for dizziness and light-headedness. Negative for brief paralysis, disturbances in coordination, focal weakness, headaches, loss of balance, numbness, paresthesias, sensory change, vertigo and weakness.   Psychiatric/Behavioral: Negative for depression and suicidal ideas.   Allergic/Immunologic: Negative for persistent infections.       I have reviewed the following portions of the patient's history: allergies, current medications, past family history, past medical history, past social history, past surgical history and problem list and confirm it's accurate.    Medications:     Current Outpatient Medications:   •  aspirin 81 MG chewable tablet, Chew 81 mg., Disp: , Rfl:   •  atorvastatin (LIPITOR) 20 MG tablet, Take 20 mg by mouth Daily., Disp: , Rfl:   •  carvedilol (COREG) 25 MG tablet, Take 1 tablet by mouth 2 (Two) Times a Day., Disp: , Rfl:   •  Cholecalciferol (Vitamin D3) 50 MCG (2000 UT) tablet, Take  by mouth Daily., Disp: , Rfl:   •  dofetilide (TIKOSYN) 250 MCG capsule, Take 250 mcg by mouth 2 (Two) Times a Day., Disp: , Rfl:   •  famotidine (PEPCID) 40 MG tablet, Take 40 mg by mouth Daily., Disp: , Rfl:   •  FERROUS SULFATE PO, Take  by mouth Daily., Disp: , Rfl:   •  finasteride (PROSCAR) 5 MG tablet, Take 1  tablet by mouth Daily., Disp: , Rfl:   •  glipiZIDE (GLUCOTROL) 5 MG tablet, Take 2.5 mg by mouth 2 (Two) Times a Day Before Meals., Disp: , Rfl:   •  potassium chloride (K-DUR) 10 MEQ CR tablet, Take 20 mEq by mouth 2 (Two) Times a Day., Disp: , Rfl:   •  torsemide (DEMADEX) 20 MG tablet, Take 20 mg by mouth Daily., Disp: , Rfl:   •  warfarin (COUMADIN) 7.5 MG tablet, Take 7.5 mg by mouth Daily. Mon, Wed, Friday-7mg- Tue, Thur, Sat  and Sunday-6mg, Disp: , Rfl:     Allergies:   Allergies   Allergen Reactions   • Dabigatran Other (See Comments)     GI Bleed   • Pradaxa [Dabigatran Etexilate Mesylate] Other (See Comments)     GI bleed   • Iodixanol Rash   • Povidone Iodine Rash and Hives     Bleeding        Objective     Physical Exam: BENJI via telehealth   Vital Signs: There were no vitals filed for this visit.  There is no height or weight on file to calculate BMI.    Physical Exam    Imaging/Labs:  CTA abdomen and pelvis ([facility])- 5/27/2022  There is redemonstration of abdominal aortic aneurysm repair with aortobiiliac stent.  The stent appears patent.  The aneurysm sac is unchanged measuring up to 6 cm.  Redemonstration of embolus material in the aneurysm sac.  No definite evidence of endoleak.  There is a Pandya catheter noted and decompressed bladder.  There is diffuse thickening of the bladder wall with surrounding inflammation and stranding, could represent cystitis; correlate clinically.  Mild nodular contour of the liver could represent hepatic parenchymal disease.  Correlate clinically.  A 3.1 cm nodule in the left lower lobe with infiltrate and irregularity in the dependent left lower lobe; consider dedicated imaging of the chest for further evaluation.  Other chronic findings as described above    Discrepancy in body of radiology report measures this nodule as 2.1cm as opposed to 3.1cm that is listed in the impression.     CTA abdomen and pelvis ([facility])-Result date  12/3/2021  Redemonstration of the abdominal aortic aneurysm measuring up to 5 cm grossly unchanged in size compared to prior.  Aortobiiliac stent graft appears patent.  There is limited evaluation of the lumen due to embolism material.  Within these limits, there is no definitive evidence of endoleak.  There is thickening of the anterior wall of the bladder of uncertain significance may represent cystitis.  Redemonstration of ill-defined infiltrate or atelectasis in the lung base may represent partially visualized round atelectasis     CTA abdomen and pelvis ([facility])-Result date 5/19/2021  Aneurysmal dilatation of the infrarenal abdominal aorta measuring up to 5.7 cm with aortobiiliac stent in place.  No definite evidence of endoleak.  There is mild narrowing of the origin of the celiac axis.  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  Cirrhotic appearance of the liver with linear abnormalities in the mid abdomen possibly representing varices.  Minimal distention of the bladder with diffuse bladder wall thickening could be due to under distention, however cystitis could have similar appearance.  Nodular focus along the pleura of the left lung base measuring up to 7 cm with linear foci extending from the region may represent round atelectasis.  Dedicated chest imaging could be considered for further evaluation.     Ultrasound aorta abdominal ([facility])-Result date 5/11/2021  Redemonstration of aneurysmal dilatation of the abdominal aorta now measuring up to 5.7 x 4.6 cm in the mid abdominal aorta and 6.1 cm distally.  There is no evidence of periaortic fluid collections.     Ultrasound aorta abdominal ([facility])-Result date 3/21/2019  The course and caliber of the abdominal aorta demonstrates fusiform dilatation of the distal abdominal aorta that measures a 10 maximal dimension of approximately 5.1 cm.  A stent graft is present appears patent.  The  visualized portion of the aortic bifurcation the bilateral common iliac arteries is within normal limits.  There is no evidence of periaortic fluid collections.    CTA Chest ([facility])-3/29/2016  1. Mild left atrial and left ventricular enlargement. Dense coronary  artery calcifications. Prior coronary bypass surgery with left internal  mammary graft. Right internal mammary artery is in normal position.   2. Tricuspid aortic valve with dense valvular calcifications. Mild  calcification of mitral valve annulus.   3. Dilatation of main pulmonary arteries consistent with pulmonary arterial hypertension.   4. Mild left lower lobe atelectasis with small left pleural effusion. Mild left pleural calcifications.   5. Calcified granulomas within right lung.     NM PET Skull Base to Mid Thigh-3/13/2014   1. Several small pleural-based plaques of the left chest, generally postinflammatory in appearance, one of which has a rounded configuration and corresponds generally to the description on patient's outside CT scan. All of these are non hypermetabolic and similar in activity.Please see above.  2. Negative PET scan elsewhere.   3. 5 cm infrarenal abdominal aneurysm as noted on the previous outside report, and enlarged prostate incidentally noted.    CT Chest-3/12/2014  1. Infrarenal abdominal aortic aneurysm, largest transverse dimension 5.0 cm.  2. Small lymph node seen within the retroperitoneum which may be reactive in nature.  3. Lesion identified with peripheral enhancement in the posterior right lobe of the liver suggesting possible hemangioma.  4. Several areas within the lungs with pleural thickening and noncalcified pulmonary nodules which are too small for percutaneous biopsy or due to location cannot be reached by percutaneous biopsy. PET scan is recommended for further evaluation of the findings within the chest.    Assessment / Plan      Assessment/Plan:  Diagnoses and all orders for this visit:    1.  Abdominal aortic aneurysm (AAA) without rupture s/p EVAR 2014 (Primary)    2. Lung nodule  -     CT Chest Without Contrast Diagnostic; Future       · AAA s/p EVAR in 2014 by Dr. Duarte.    · 5/2021 imaging with increase in native aneurysmal sac from 5.1 cm to almost 6 cm with concern for type II endoleak   · S/p angiogram with embolization with several coils of right L4 lumbar radicular artery on 6/16/2021 with Dr Huddleston.  · Surveillance imaging personally reviewed with continued measurement of ~ 5.7cm-6cm, no interval growth in native aneurismal sac. Difficult to ascertain about endoleak with coil artifact. Per radiology report no evidence of endoleak. With stability of native sac will return to annual surveillance   · Of note history of pulmonary nodules as far back as 2014 with Dr Duarte  ·  PET scan 3/2014 showing these as large as 1.9cm non-hypermetabolic with Dr Duarte feeling they appear to be inflammatory nodules.   · Not currently followed by pulmonology. No dedicated CT imaging of his chest since that time.   · CTA abdomen picked up a LLL infiltrate and nodule that appears enlarged to 2.1-3.1cm (radiology discrepancy as above). On personal review this looks to be the same nodule as 2014 PET and measures 2.3cm  · Will obtain dedicated CT Chest     Follow Up:   Return in about 3 weeks (around 6/30/2022) for Imaging next visit: CT Chest.   Return in about 1 year (around 6/9/2023) for Imaging next visit: CTA abd pelvis.  Or sooner for any further concerns or worsening sign and symptoms. If unable to reach us in the office please dial 911 or go to the nearest emergency department.    This visit has been rescheduled as a phone visit to comply with patient safety concerns in accordance with CDC recommendations. Total time of discussion was 30 minutes.     SG Wright  [facility] Cardiothoracic Surgery

## 2022-06-14 ENCOUNTER — TELEPHONE (OUTPATIENT)
Dept: CARDIAC SURGERY | Facility: CLINIC | Age: 82
End: 2022-06-14

## 2022-06-14 NOTE — TELEPHONE ENCOUNTER
Caller: Torri Blanchard    Relationship to patient: Emergency Contact    Best call back number: [phone]    Chief complaint: PT SPOUSE CALLING IN TO REQUEST THAT THAT UPCOMING VISIT ON 6.30.22 BE MOVED TO TELEHEALTH. PT SPOUSE STATED THAT FINDING TRANSPORTATION TO VISITS IN [place] IS DIFFICULT FOR THEM & IF THIS COULD BE MOVED TO TELEHEALTH THEY WOULD APPRECIATE IT.     Type of visit: FOLLOW UP    Requested date: 6.30.22    If rescheduling, when is the original appointment: 6.30.22    Additional notes: PLEASE CONTACT PT SPOUSE TO LET HER KNOW IF IF ABLE TO MOVE VISIT TO TELEHEALTH. THANK YOU!

## 2022-06-22 ENCOUNTER — TELEPHONE (OUTPATIENT)
Dept: CARDIAC SURGERY | Facility: CLINIC | Age: 82
End: 2022-06-22

## 2022-06-22 NOTE — TELEPHONE ENCOUNTER
Spoke with pt's wife Mrs Wild she is aware that KIMBER BETTENCOURT is out of the office I  will get an answer from her when she returns to make sure that this follow up visit can be a telehealth visit on 6/30. I told Mrs. Wild I will call her on Monday to tell her what KIMBER BETTENCOURT's answer is, she V/U

## 2022-07-07 ENCOUNTER — OFFICE VISIT (OUTPATIENT)
Dept: CARDIAC SURGERY | Facility: CLINIC | Age: 82
End: 2022-07-07

## 2022-07-07 DIAGNOSIS — I71.40 ABDOMINAL AORTIC ANEURYSM (AAA) WITHOUT RUPTURE: ICD-10-CM

## 2022-07-07 DIAGNOSIS — R91.8 PULMONARY NODULES/LESIONS, MULTIPLE: Primary | ICD-10-CM

## 2022-07-07 PROCEDURE — 99442 PR PHYS/QHP TELEPHONE EVALUATION 11-20 MIN: CPT | Performed by: NURSE PRACTITIONER

## 2022-07-07 NOTE — PROGRESS NOTES
Telehealth E-Visit      Patient Name: Cuauhtemoc Blanchard  : 1940   MRN: [id number]     The patient has consented to a Telehealth Visit.     Chief Complaint:    Chief Complaint   Patient presents with   • Follow-up     3 week f/u w/ CT chest for AAA. Pt states that he is very fatigued and always SOB.        History of Present Illness:   Cuauhtemoc Blanchard is a 82 y.o. male  former smoker with history of oxygen dependent COPD, inflammatory pulmonary nodules, HTN, HLD on statin therapy, non-insulin-dependent DM, CAD s/p CABG, VHD s/p aortic valve replacement 2017 at [facility] on Coumadin therapy, and AAA s/p EVAR in  by Dr. Duarte.  Surveillance imaging from 2021 with increase in native aneurysmal sac from 5.1 cm to almost 6 cm with concern for type II endoleak s/p angiogram with embolization with several coils of right L4 lumbar radicular artery on 2021 with Dr Huddleston. Pt has no unusual abdominal or back pain and good blood pressure control. His surveillance imaging demonstrated continued measurement of ~5.7-6cm with no interval growth in native aneurysmal sac (difficult to ascertain about endoleak with coil artifact. Per radiology report no evidence of endoleak). With stability of native sac pt has been returned to annual surveillance (imaging due 2023). Of note patient has had a history of pulmonary nodules as far back as  with Dr Duarte and as large as 1.9cm. He had a PET scan 3/2014 showing these as non-hypermetabolic with appearance to be inflammatory nodules. He is not currently followed by pulmonology and it does not appear he has had any dedicated CT imaging of his chest since that time. His CT abdomen picked up a LLL infiltrate and nodule that appears enlarged to 2.1-3.1cm (radiology discrepancy). We obtained dedicated CT Chest and he presents today to review results. He is chronically on 3L NC at all times. He denies chronic cough or hemoptysis. He does suffer from AREVALO but has no  "constitutional symptoms reporting good appetite with no weight fluctuations.     Subjective      Review of Systems:   Review of Systems   Constitutional: Positive for malaise/fatigue. Negative for chills, decreased appetite, diaphoresis, fever, night sweats, weight gain and weight loss.   HENT: Negative for hoarse voice.    Eyes: Negative for blurred vision, double vision and visual disturbance.   Cardiovascular: Positive for dyspnea on exertion and leg swelling (some swelling from time to time). Negative for chest pain, claudication, irregular heartbeat, near-syncope, orthopnea, palpitations, paroxysmal nocturnal dyspnea and syncope.   Respiratory: Positive for shortness of breath. Negative for cough, hemoptysis, sputum production and wheezing.    Hematologic/Lymphatic: Positive for bleeding problem. Negative for adenopathy. Bruises/bleeds easily.   Skin: Negative for color change, nail changes, poor wound healing and rash.   Musculoskeletal: Positive for arthritis and joint pain (right arm ). Negative for back pain, falls and muscle cramps.   Gastrointestinal: Negative for abdominal pain, dysphagia and heartburn.   Genitourinary: Negative for flank pain.   Neurological: Positive for dizziness (when rising form sitting) and loss of balance (when rising from sitting must \"take a minute ' to get  his balance). Negative for brief paralysis, disturbances in coordination, focal weakness, headaches, light-headedness, numbness, paresthesias, sensory change, vertigo and weakness.   Psychiatric/Behavioral: Positive for depression. Negative for suicidal ideas. The patient is not nervous/anxious.    Allergic/Immunologic: Positive for environmental allergies. Negative for persistent infections.       I have reviewed the following portions of the patient's history: allergies, current medications, past family history, past medical history, past social history, past surgical history and problem list and confirm it's " accurate.    Medications:     Current Outpatient Medications:   •  aspirin 81 MG chewable tablet, Chew 81 mg., Disp: , Rfl:   •  atorvastatin (LIPITOR) 20 MG tablet, Take 20 mg by mouth Daily., Disp: , Rfl:   •  carvedilol (COREG) 25 MG tablet, Take 1 tablet by mouth 2 (Two) Times a Day., Disp: , Rfl:   •  Cholecalciferol (Vitamin D3) 50 MCG (2000 UT) tablet, Take  by mouth Daily., Disp: , Rfl:   •  dofetilide (TIKOSYN) 250 MCG capsule, Take 250 mcg by mouth 2 (Two) Times a Day., Disp: , Rfl:   •  famotidine (PEPCID) 40 MG tablet, Take 40 mg by mouth Daily., Disp: , Rfl:   •  FERROUS SULFATE PO, Take  by mouth Daily., Disp: , Rfl:   •  finasteride (PROSCAR) 5 MG tablet, Take 1 tablet by mouth Daily., Disp: , Rfl:   •  glipiZIDE (GLUCOTROL) 5 MG tablet, Take 2.5 mg by mouth 2 (Two) Times a Day Before Meals., Disp: , Rfl:   •  potassium chloride (K-DUR) 10 MEQ CR tablet, Take 20 mEq by mouth 2 (Two) Times a Day., Disp: , Rfl:   •  torsemide (DEMADEX) 20 MG tablet, Take 20 mg by mouth Daily., Disp: , Rfl:   •  warfarin (COUMADIN) 7.5 MG tablet, Take 7.5 mg by mouth Daily. Mon, Wed, Friday-7mg- Tue, Thur, Sat  and Sunday-6mg, Disp: , Rfl:     Allergies:   Allergies   Allergen Reactions   • Dabigatran Other (See Comments)     GI Bleed   • Pradaxa [Dabigatran Etexilate Mesylate] Other (See Comments)     GI bleed   • Iodixanol Rash   • Povidone Iodine Rash and Hives     Bleeding        Objective     Physical Exam: BENJI via telehealth   Vital Signs: There were no vitals filed for this visit.  There is no height or weight on file to calculate BMI.    Physical Exam    Imaging/Labs:  CT Chest without contrast ([facility])-6/27/2022  1. Multiple mass-like airspace opacities along the pleural surface of the left lung are present described as above. While these could represent areas of pleural thickening/scarring or sequela of asbestos exposure, neoplastic process in these areas cannot be excluded. Pet CT scan  recommended.   2. Unchanged pulmonary nodules on the right measuring up to 7 mm. Follow up chest CT imaging recommended.   3. Remainder as above.   Report Sign Date: 6/27/2022 6:06 PM, Electronically Signed By: Hans Castro MD    CTA abdomen and pelvis ([facility])- 5/27/2022  There is redemonstration of abdominal aortic aneurysm repair with aortobiiliac stent.  The stent appears patent.  The aneurysm sac is unchanged measuring up to 6 cm.  Redemonstration of embolus material in the aneurysm sac.  No definite evidence of endoleak.  There is a Pandya catheter noted and decompressed bladder.  There is diffuse thickening of the bladder wall with surrounding inflammation and stranding, could represent cystitis; correlate clinically.  Mild nodular contour of the liver could represent hepatic parenchymal disease.  Correlate clinically.  A 3.1 cm nodule in the left lower lobe with infiltrate and irregularity in the dependent left lower lobe; consider dedicated imaging of the chest for further evaluation.  Other chronic findings as described above     Discrepancy in body of radiology report measures this nodule as 2.1cm as opposed to 3.1cm that is listed in the impression.      CTA abdomen and pelvis ([facility])-Result date 12/3/2021  Redemonstration of the abdominal aortic aneurysm measuring up to 5 cm grossly unchanged in size compared to prior.  Aortobiiliac stent graft appears patent.  There is limited evaluation of the lumen due to embolism material.  Within these limits, there is no definitive evidence of endoleak.  There is thickening of the anterior wall of the bladder of uncertain significance may represent cystitis.  Redemonstration of ill-defined infiltrate or atelectasis in the lung base may represent partially visualized round atelectasis     CTA abdomen and pelvis ([facility])-Result date 5/19/2021  Aneurysmal dilatation of the infrarenal abdominal aorta measuring up  to 5.7 cm with aortobiiliac stent in place.  No definite evidence of endoleak.  There is mild narrowing of the origin of the celiac axis.  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  Cirrhotic appearance of the liver with linear abnormalities in the mid abdomen possibly representing varices.  Minimal distention of the bladder with diffuse bladder wall thickening could be due to under distention, however cystitis could have similar appearance.  Nodular focus along the pleura of the left lung base measuring up to 7 cm with linear foci extending from the region may represent round atelectasis.  Dedicated chest imaging could be considered for further evaluation.     Ultrasound aorta abdominal ([facility])-Result date 5/11/2021  Redemonstration of aneurysmal dilatation of the abdominal aorta now measuring up to 5.7 x 4.6 cm in the mid abdominal aorta and 6.1 cm distally.  There is no evidence of periaortic fluid collections.     Ultrasound aorta abdominal ([facility])-Result date 3/21/2019  The course and caliber of the abdominal aorta demonstrates fusiform dilatation of the distal abdominal aorta that measures a 10 maximal dimension of approximately 5.1 cm.  A stent graft is present appears patent.  The visualized portion of the aortic bifurcation the bilateral common iliac arteries is within normal limits.  There is no evidence of periaortic fluid collections.     CTA Chest ([facility])-3/29/2016  1. Mild left atrial and left ventricular enlargement. Dense coronary  artery calcifications. Prior coronary bypass surgery with left internal  mammary graft. Right internal mammary artery is in normal position.   2. Tricuspid aortic valve with dense valvular calcifications. Mild  calcification of mitral valve annulus.   3. Dilatation of main pulmonary arteries consistent with pulmonary arterial hypertension.   4. Mild left lower lobe atelectasis with small left pleural  effusion. Mild left pleural calcifications.   5. Calcified granulomas within right lung.      NM PET Skull Base to Mid Thigh-3/13/2014   1. Several small pleural-based plaques of the left chest, generally postinflammatory in appearance, one of which has a rounded configuration and corresponds generally to the description on patient's outside CT scan. All of these are non hypermetabolic and similar in activity.Please see above.  2. Negative PET scan elsewhere.   3. 5 cm infrarenal abdominal aneurysm as noted on the previous outside report, and enlarged prostate incidentally noted.     CT Chest-3/12/2014  1. Infrarenal abdominal aortic aneurysm, largest transverse dimension 5.0 cm.  2. Small lymph node seen within the retroperitoneum which may be reactive in nature.  3. Lesion identified with peripheral enhancement in the posterior right lobe of the liver suggesting possible hemangioma.  4. Several areas within the lungs with pleural thickening and noncalcified pulmonary nodules which are too small for percutaneous biopsy or due to location cannot be reached by percutaneous biopsy. PET scan is recommended for further evaluation of the findings within the chest.    Assessment / Plan      Assessment/Plan:  Diagnoses and all orders for this visit:    1. Pulmonary nodules/lesions, multiple (Primary)    2. Abdominal aortic aneurysm (AAA) without rupture s/p EVAR 2014         · AAA s/p EVAR in 2014 by Dr. Duarte.    · 5/2021 imaging with increase in native aneurysmal sac from 5.1 cm to almost 6 cm with concern for type II endoleak   · S/p angiogram with embolization with several coils of right L4 lumbar radicular artery on 6/16/2021 with Dr Huddleston.  · Surveillance imaging personally reviewed with continued measurement of ~ 5.7cm-6cm, no interval growth in native aneurismal sac. Difficult to ascertain about endoleak with coil artifact. Per radiology report no evidence of endoleak. With stability of native sac will return to  annual surveillance (due 5/2023)  · Of note history of pulmonary nodules as far back as 2014 with Dr Duarte  · PET scan 3/2014 showing these as large as 1.9cm non-hypermetabolic with Dr Duarte feeling they appear to be inflammatory nodules.   · Not currently followed by pulmonology. No dedicated CT imaging of his chest since 2016  · CTA abdomen picked up a LLL infiltrate and nodule that appears enlarged to 2.1-3.1cm (radiology discrepancy as above). On personal review this looks to be the same nodule as 2014 PET and measures 2.3cm  · Dedicated CT chest obtained and personally reviewed with LML peripheral/lateral wall pleural density/thickening with central calcification. LLL infiltrative process with lobular mass that appears to be same nodule from PET although larger   · Will discuss case with Dr Duarte to ascertain if he would like to repeat PET at 6 month surveillance date    Addendum: Case discussed with Dr Duarte. Will obtain PET in 6 months     Follow Up:   Return in about 6 months (around 1/7/2023) for Imaging next visit. Return in 1 year (around 5/2023) for imaging next visit  Or sooner for any further concerns or worsening sign and symptoms. If unable to reach us in the office please dial 911 or go to the nearest emergency department.    This visit has been rescheduled as a phone visit to comply with patient safety concerns in accordance with CDC recommendations. Total time of discussion was 20 minutes.     SG Wright  [facility] Cardiothoracic Surgery

## 2022-11-22 DIAGNOSIS — R91.8 PULMONARY NODULES/LESIONS, MULTIPLE: Primary | ICD-10-CM

## 2023-01-03 DIAGNOSIS — Z00.6 EXAMINATION FOR NORMAL COMPARISON FOR CLINICAL RESEARCH: Primary | ICD-10-CM

## 2023-05-01 DIAGNOSIS — I71.43 INFRARENAL ABDOMINAL AORTIC ANEURYSM (AAA) WITHOUT RUPTURE: Primary | ICD-10-CM

## 2023-05-30 DIAGNOSIS — I71.40 ABDOMINAL AORTIC ANEURYSM (AAA) WITHOUT RUPTURE, UNSPECIFIED PART: Primary | ICD-10-CM

## 2023-05-30 RX ORDER — METHYLPREDNISOLONE 32 MG/1
TABLET ORAL
Qty: 2 TABLET | Refills: 0 | Status: SHIPPED | OUTPATIENT
Start: 2023-05-30

## 2023-05-30 RX ORDER — DIPHENHYDRAMINE HCL 50 MG
50 CAPSULE ORAL ONCE
Qty: 1 CAPSULE | Refills: 0 | Status: SHIPPED | OUTPATIENT
Start: 2023-05-30 | End: 2023-05-30

## 2023-06-01 DIAGNOSIS — I71.40 ABDOMINAL AORTIC ANEURYSM (AAA) WITHOUT RUPTURE, UNSPECIFIED PART: Primary | ICD-10-CM

## 2023-06-01 RX ORDER — DIPHENHYDRAMINE HCL 50 MG
50 CAPSULE ORAL ONCE
Qty: 1 CAPSULE | Refills: 0 | Status: SHIPPED | OUTPATIENT
Start: 2023-06-01 | End: 2023-06-01

## 2023-06-07 DIAGNOSIS — I71.43 INFRARENAL ABDOMINAL AORTIC ANEURYSM (AAA) WITHOUT RUPTURE: ICD-10-CM

## 2023-06-07 NOTE — PROGRESS NOTES
[facility] Office Follow Up Note     Date of Encounter: 2023     Name: Cuauhtemoc Blanchard  : 1940     Referred By: No ref. provider found  PCP: Isaías Thornton MD    Chief Complaint:    Chief Complaint   Patient presents with    Aortic Aneurysm     1 year follow-up with CTA abdomen/pelvis. History of EVAR .        Subjective      History of Present Illness:    Cuauhtemoc Blanchard is a 83 y.o. male former smoker with history of oxygen dependent COPD, inflammatory pulmonary nodules, HTN, HLD on statin therapy, non-insulin-dependent DM, CAD s/p CABG, VHD s/p aortic valve replacement 2017 at [facility] on Coumadin therapy, and AAA s/p EVAR in  by Dr. Duarte.  Surveillance imaging from 2021 with increase in native aneurysmal sac from 5.1 cm to almost 6 cm with concern for type II endoleak s/p angiogram with embolization with several coils of right L4 lumbar radicular artery on 2021 with Dr Huddleston.   Pt presents today for his annual EVAR surveillance with  no unusual abdominal or back pain and good blood pressure control.     Of note patient has had a history of pulmonary nodules as far back as  with Dr Duarte and as large as 1.9cm. He had a PET scan 3/2014 showing these as non-hypermetabolic with appearance to be inflammatory nodules. His CT abdomen picked up a LLL infiltrate and nodule that appears enlarged to 2.1-3.1cm (radiology discrepancy). Dedicated CT Chest revealing LML peripheral/lateral wall pleural density/thickening with central calcification. LLL infiltrative process with lobular mass that appears to be same nodule from PET although larger.   It appears that patient had CT-guided biopsy of left lower lobe 10/4/2022 at [place].  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  AFB and GMS stains are negative; negative for  malignancy.  Apparently he was referred to a black lung specialist who told him there was nothing they could do to help him and ordered a breathing test which patient cancelled. He is chronically on 3L NC at all times. He denies chronic cough or hemoptysis. He does suffer from AREVALO but has no constitutional symptoms reporting good appetite with no weight fluctuations. He does not wish to return to a doctor who cannot help him.     Review of Systems:  Review of Systems   Constitutional: Negative for chills, decreased appetite, diaphoresis, fever, malaise/fatigue, night sweats, weight gain and weight loss.   HENT:  Negative for hoarse voice.    Eyes:  Negative for blurred vision, double vision and visual disturbance.   Cardiovascular:  Positive for dyspnea on exertion. Negative for chest pain, claudication, irregular heartbeat, leg swelling, near-syncope, orthopnea, palpitations, paroxysmal nocturnal dyspnea and syncope.   Respiratory:  Positive for wheezing. Negative for cough, hemoptysis, shortness of breath and sputum production.    Hematologic/Lymphatic: Negative for adenopathy and bleeding problem. Does not bruise/bleed easily.   Skin:  Negative for color change, nail changes, poor wound healing and rash.   Musculoskeletal:  Negative for back pain, falls and muscle cramps.   Gastrointestinal:  Negative for abdominal pain, dysphagia and heartburn.   Genitourinary:  Negative for flank pain.   Neurological:  Positive for light-headedness (when going from seated to standing). Negative for brief paralysis, disturbances in coordination, dizziness, focal weakness, headaches, loss of balance, numbness, paresthesias, sensory change, vertigo and weakness.   Psychiatric/Behavioral:  Negative for depression and suicidal ideas.    Allergic/Immunologic: Negative for persistent infections.     I have reviewed the following portions of the patient's history: allergies, current medications, past family history, past medical  history, past social history, past surgical history, problem list, and ROS and confirm it's accurate.    Allergies:  Allergies   Allergen Reactions    Dabigatran Other (See Comments)     GI Bleed    Pradaxa [Dabigatran Etexilate Mesylate] Other (See Comments)     GI bleed    Iodixanol Rash    Povidone Iodine Rash and Hives     Bleeding        Medications:      Current Outpatient Medications:     albuterol (PROVENTIL) (2.5 MG/3ML) 0.083% nebulizer solution, 2.5 mg 3 (Three) Times a Day., Disp: , Rfl:     aspirin 81 MG chewable tablet, Chew 1 tablet., Disp: , Rfl:     atorvastatin (LIPITOR) 20 MG tablet, Take 2 tablets by mouth Daily., Disp: , Rfl:     carvedilol (COREG) 25 MG tablet, Take 1 tablet by mouth 2 (Two) Times a Day., Disp: , Rfl:     Cholecalciferol (Vitamin D3) 50 MCG (2000 UT) tablet, Take  by mouth Daily., Disp: , Rfl:     dofetilide (TIKOSYN) 250 MCG capsule, Take 1 capsule by mouth 2 (Two) Times a Day., Disp: , Rfl:     famotidine (PEPCID) 40 MG tablet, Take 1 tablet by mouth Daily., Disp: , Rfl:     FERROUS SULFATE PO, Take  by mouth Daily., Disp: , Rfl:     finasteride (PROSCAR) 5 MG tablet, Take 1 tablet by mouth Daily., Disp: , Rfl:     glipiZIDE (GLUCOTROL) 5 MG tablet, Take 2.5 mg by mouth 2 (Two) Times a Day Before Meals., Disp: , Rfl:     potassium chloride (K-DUR) 10 MEQ CR tablet, Take 2 tablets by mouth 2 (Two) Times a Day., Disp: , Rfl:     torsemide (DEMADEX) 20 MG tablet, Take 1 tablet by mouth Daily., Disp: , Rfl:     warfarin (COUMADIN) 7.5 MG tablet, Take 1 tablet by mouth Daily. Mon, Wed, Friday-7mg- Tue, Thur, Sat  and Sunday-6mg, Disp: , Rfl:     History:   Past Medical History:   Diagnosis Date    Aneurysm     Aneurysm of abdominal aorta     Arrhythmia     Benign neoplasm of skin     CAD (coronary artery disease)     Diabetes mellitus     H/O: varicose veins     Hypertension     Lung nodule     Neurogenic bladder     Pneumonia        Past Surgical History:   Procedure Laterality  "Date    ABDOMINAL AORTIC ANEURYSM REPAIR WITH ENDOGRAFT  2014    CARDIAC VALVE REPLACEMENT      CHOLECYSTECTOMY      CORONARY ARTERY BYPASS GRAFT      KEARNEY CATHETER      INTERVENTIONAL RADIOLOGY PROCEDURE N/A 2021    Procedure: angiogram and possible embolization AAA endoleak;  Surgeon: Carmelo Huddleston MD;  Location: [facility] INVASIVE LOCATION;  Service: Interventional Radiology;  Laterality: N/A;       Social History     Socioeconomic History    Marital status:     Number of children: 3   Tobacco Use    Smoking status: Former     Packs/day: 0.75     Years: 30.00     Pack years: 22.50     Types: Cigarettes     Quit date:      Years since quittin.4    Smokeless tobacco: Never   Vaping Use    Vaping Use: Never used   Substance and Sexual Activity    Alcohol use: No    Drug use: No        Family History   Problem Relation Age of Onset    Coronary artery disease Mother     Diabetes Mother     Other Father        Objective     Physical Exam:  Vitals:    23 1312 23 1313   BP: 122/60 123/60   BP Location: Right arm Left arm   Patient Position: Sitting Sitting   Pulse: 60    Temp: 98.2 °F (36.8 °C)    SpO2: 92%    Weight: 107 kg (236 lb)    Height: 182.9 cm (72\")       Body mass index is 32.01 kg/m².    Physical Exam  Vitals and nursing note reviewed.   Constitutional:       Appearance: Normal appearance. He is obese.      Interventions: He is not intubated.  Cardiovascular:      Rate and Rhythm: Normal rate.      Pulses:           Radial pulses are 2+ on the right side and 2+ on the left side.      Heart sounds: S1 normal and S2 normal.   Pulmonary:      Effort: Pulmonary effort is normal. Prolonged expiration present. No tachypnea, bradypnea, accessory muscle usage, respiratory distress or retractions. He is not intubated.      Breath sounds: Normal breath sounds and air entry. Examination of the left-upper field reveals wheezing. Examination of the right-lower field reveals " rales. Examination of the left-lower field reveals rhonchi and rales.   Musculoskeletal:      Right lower le+      Left lower le+   Skin:     General: Skin is warm and dry.   Neurological:      Mental Status: He is alert and oriented to person, place, and time. Mental status is at baseline.       Imaging/Labs:  CT abdomen pelvis angiogram w and/or wo IV contrast (2023 10:47)   1. Aortobiiliac stent grafts involving an aneurysm involving the infrarenal abdominal aorta measuring up to 6 cm grossly unchanged in terms of size. Stent appears patent.   2. Wall thickening and perivesical stranding involving the urinary bladder with Pandya catheter present may represent cystitis.   3. Left lung base airspace opacity similar to prior exam.   Report Sign Date: 2023 4:22 PM, Electronically Signed By: Hans Castro MD     CT IR procedure (10/04/2022 11:12)   CT-guided biopsy of left upper (difficult to distinguish fissure) lobe lung nodule.   Pleural-based nodule more anteriorly located near base of heart.   Plan: Specimen(s) sent for evaluation.     PET/CT bone skull base to mid thigh (2022 15:04)   1. There are multiple pleural plaques/nodules and airspace opacities in the left lung that show increased radiotracer activity described as above. Findings may represent neoplasm in these areas. Tissue sampling suggested.   2. Additional pulmonary nodule and airspace opacities in the lungs are present. Pulmonary nodules in the right lung are too small to characterize. Follow-up chest CT imaging for these nodules is suggested.   3. Irregular masslike wall thickening involving the urinary bladder. Malignancy cannot be excluded. Cystoscopic evaluation recommended.   3. Infrarenal abdominal aortic aneurysm with stent device as above.   4. Increased uptake at the level of the vocal cords. This may be artifactual related to FDG secretion in this area. However, clinical correlation is recommended.   Report Sign  Date: 8/25/2022 9:54 AM, Electronically Signed By: Hans Castro MD     CT Chest without contrast ([facility])-6/27/2022  1. Multiple mass-like airspace opacities along the pleural surface of the left lung are present described as above. While these could represent areas of pleural thickening/scarring or sequela of asbestos exposure, neoplastic process in these areas cannot be excluded. Pet CT scan recommended.   2. Unchanged pulmonary nodules on the right measuring up to 7 mm. Follow up chest CT imaging recommended.   3. Remainder as above.   Report Sign Date: 6/27/2022 6:06 PM, Electronically Signed By: Hans Castro MD     CTA abdomen and pelvis ([facility])- 5/27/2022  There is redemonstration of abdominal aortic aneurysm repair with aortobiiliac stent.  The stent appears patent.  The aneurysm sac is unchanged measuring up to 6 cm.  Redemonstration of embolus material in the aneurysm sac.  No definite evidence of endoleak.  There is a Pandya catheter noted and decompressed bladder.  There is diffuse thickening of the bladder wall with surrounding inflammation and stranding, could represent cystitis; correlate clinically.  Mild nodular contour of the liver could represent hepatic parenchymal disease.  Correlate clinically.  A 3.1 cm nodule in the left lower lobe with infiltrate and irregularity in the dependent left lower lobe; consider dedicated imaging of the chest for further evaluation.  Other chronic findings as described above     Discrepancy in body of radiology report measures this nodule as 2.1cm as opposed to 3.1cm that is listed in the impression.      CTA abdomen and pelvis ([facility])-Result date 12/3/2021  Redemonstration of the abdominal aortic aneurysm measuring up to 5 cm grossly unchanged in size compared to prior.  Aortobiiliac stent graft appears patent.  There is limited evaluation of the lumen due to embolism material.  Within these limits, there is  no definitive evidence of endoleak.  There is thickening of the anterior wall of the bladder of uncertain significance may represent cystitis.  Redemonstration of ill-defined infiltrate or atelectasis in the lung base may represent partially visualized round atelectasis     CTA abdomen and pelvis ([facility])-Result date 5/19/2021  Aneurysmal dilatation of the infrarenal abdominal aorta measuring up to 5.7 cm with aortobiiliac stent in place.  No definite evidence of endoleak.  There is mild narrowing of the origin of the celiac axis.  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  Cirrhotic appearance of the liver with linear abnormalities in the mid abdomen possibly representing varices.  Minimal distention of the bladder with diffuse bladder wall thickening could be due to under distention, however cystitis could have similar appearance.  Nodular focus along the pleura of the left lung base measuring up to 7 cm with linear foci extending from the region may represent round atelectasis.  Dedicated chest imaging could be considered for further evaluation.     Ultrasound aorta abdominal ([facility])-Result date 5/11/2021  Redemonstration of aneurysmal dilatation of the abdominal aorta now measuring up to 5.7 x 4.6 cm in the mid abdominal aorta and 6.1 cm distally.  There is no evidence of periaortic fluid collections.     Ultrasound aorta abdominal ([facility])-Result date 3/21/2019  The course and caliber of the abdominal aorta demonstrates fusiform dilatation of the distal abdominal aorta that measures a 10 maximal dimension of approximately 5.1 cm.  A stent graft is present appears patent.  The visualized portion of the aortic bifurcation the bilateral common iliac arteries is within normal limits.  There is no evidence of periaortic fluid collections.     CTA Chest ([facility]3/29/2016  1. Mild left atrial and left ventricular enlargement. Dense  coronary  artery calcifications. Prior coronary bypass surgery with left internal  mammary graft. Right internal mammary artery is in normal position.   2. Tricuspid aortic valve with dense valvular calcifications. Mild  calcification of mitral valve annulus.   3. Dilatation of main pulmonary arteries consistent with pulmonary arterial hypertension.   4. Mild left lower lobe atelectasis with small left pleural effusion. Mild left pleural calcifications.   5. Calcified granulomas within right lung.      NM PET Skull Base to Mid Thigh-3/13/2014   1. Several small pleural-based plaques of the left chest, generally postinflammatory in appearance, one of which has a rounded configuration and corresponds generally to the description on patient's outside CT scan. All of these are non hypermetabolic and similar in activity.Please see above.  2. Negative PET scan elsewhere.   3. 5 cm infrarenal abdominal aneurysm as noted on the previous outside report, and enlarged prostate incidentally noted.     CT Chest-3/12/2014  1. Infrarenal abdominal aortic aneurysm, largest transverse dimension 5.0 cm.  2. Small lymph node seen within the retroperitoneum which may be reactive in nature.  3. Lesion identified with peripheral enhancement in the posterior right lobe of the liver suggesting possible hemangioma.  4. Several areas within the lungs with pleural thickening and noncalcified pulmonary nodules which are too small for percutaneous biopsy or due to location cannot be reached by percutaneous biopsy. PET scan is recommended for further evaluation of the findings within the chest.    Assessment / Plan      Assessment / Plan:  Diagnoses and all orders for this visit:    1. Infrarenal abdominal aortic aneurysm (AAA) without rupture s/p EVAR 2014 (Primary)  2. 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 Duarte.    5/2021 imaging with increase in native aneurysmal sac from 5.1 cm to  almost 6 cm with concern for type II endoleak   S/p angiogram with embolization with several coils of right L4 lumbar radicular artery on 6/16/2021 with Dr Huddleston.  5/2022 surveillance imaging personally reviewed with continued measurement of ~ 5.7cm-6cm, no interval growth in native aneurismal sac. Difficult to ascertain about endoleak with coil artifact. Per radiology report no evidence of endoleak. With stability of native sac will return to annual surveillance (due 5/2023)  Annual surveillance asymptomatic with appropriate BP control  Imaging demonstrates no signs of endoleak, native aneurysmal sac enlargement, migration of graft, or separation of device components. Native sac measuring 5.8cm which is stable if not smaller than 6cm measurement from last year   Continue annual surveillance with CTA A/P (due 6/2024)      3. Pulmonary nodules/lesions, multiple  pulmonary nodules as far back as 2014 with Dr Duarte  PET scan 3/2014 showing these as large as 1.9cm non-hypermetabolic with Dr Duarte feeling they appear to be inflammatory nodules.   No dedicated CT imaging of his chest since 2016  CTA abdomen picked up a LLL infiltrate and nodule that appears enlarged to 2.1-3.1cm (radiology discrepancy as above). On personal review this looks to be the same nodule as 2014 PET and measures 2.3cm  Dedicated CT chest 6/2022 personally reviewed with LML peripheral/lateral wall pleural density/thickening with central calcification. LLL infiltrative process with lobular mass that appears to be same nodule from PET although larger   NM PET ordered by TIMOTEO CASTILLO 8/2022 with avidity present and recommendations for tissue sampling  FNB ordered by TIMOTEO CASTILLO performed at [place] 10/2022. 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  AFB and GMS stains are negative; negative for malignancy.  Referred to a black lung  "specialist. Pt is uncertain if he will follow-up with them as \"there is nothing they can do\"  Will obtain surveillance imaging at the time of his recall if not performed by [facility]    Follow Up:   Return in about 1 year (around 6/8/2024) for Imaging: CT Chest, Imaging: CTA Abd/Pelvis.   Or sooner for any further concerns or worsening sign and symptoms. If unable to reach us in the office please dial 911 or go to the nearest emergency department.      SG Cormier  [facility]    Time Spent: I spent 49 minutes caring for Cuauhtemoc on this date of service. This time includes time spent by me in the following activities: preparing for the visit, reviewing tests, obtaining and/or reviewing a separately obtained history, performing a medically appropriate examination and/or evaluation, counseling and educating the patient/family/caregiver, ordering medications, tests, or procedures, documenting information in the medical record, independently interpreting results and communicating that information with the patient/family/caregiver, and care coordination.    "

## 2023-06-08 ENCOUNTER — OFFICE VISIT (OUTPATIENT)
Dept: CARDIAC SURGERY | Facility: CLINIC | Age: 83
End: 2023-06-08
Payer: MEDICARE

## 2023-06-08 VITALS
SYSTOLIC BLOOD PRESSURE: 123 MMHG | DIASTOLIC BLOOD PRESSURE: 60 MMHG | HEIGHT: 72 IN | WEIGHT: 236 LBS | HEART RATE: 60 BPM | BODY MASS INDEX: 31.97 KG/M2 | TEMPERATURE: 98.2 F | OXYGEN SATURATION: 92 %

## 2023-06-08 DIAGNOSIS — I97.89 TYPE II ENDOLEAK OF AORTIC GRAFT: ICD-10-CM

## 2023-06-08 DIAGNOSIS — R91.8 PULMONARY NODULES/LESIONS, MULTIPLE: ICD-10-CM

## 2023-06-08 DIAGNOSIS — I71.43 INFRARENAL ABDOMINAL AORTIC ANEURYSM (AAA) WITHOUT RUPTURE: Primary | ICD-10-CM

## 2023-06-08 RX ORDER — ALBUTEROL SULFATE 2.5 MG/3ML
2.5 SOLUTION RESPIRATORY (INHALATION) 3 TIMES DAILY
COMMUNITY

## 2023-06-09 PROBLEM — I71.43 INFRARENAL ABDOMINAL AORTIC ANEURYSM (AAA) WITHOUT RUPTURE: Status: ACTIVE | Noted: 2017-02-03

## 2023-10-31 ENCOUNTER — TELEPHONE (OUTPATIENT)
Dept: CARDIAC SURGERY | Facility: CLINIC | Age: 83
End: 2023-10-31

## 2023-10-31 NOTE — TELEPHONE ENCOUNTER
Caller: Cuauhtemoc Blanchard    Relationship to patient: Self    Best call back number: [phone]    Chief complaint: PT REQUESTING UPCOMING APPT BE MOVED TO TELEPHONE AS PT HAS A HARD TIME GETTING TRANSPORTATION TO OFFICE     Type of visit: FOLLOW UP    Requested date: JUNE 2024    If rescheduling, when is the original appointment: JUNE 2024     Additional notes: PLEASE CONTACT PT TO LET HIM KNOW IF THIS IS POSSIBLE THANK YOU

## 2024-04-24 DIAGNOSIS — I71.43 INFRARENAL ABDOMINAL AORTIC ANEURYSM (AAA) WITHOUT RUPTURE: ICD-10-CM

## 2024-04-24 DIAGNOSIS — R91.8 PULMONARY NODULES/LESIONS, MULTIPLE: Primary | ICD-10-CM

## 2024-05-10 DIAGNOSIS — I71.43 INFRARENAL ABDOMINAL AORTIC ANEURYSM (AAA) WITHOUT RUPTURE: Primary | ICD-10-CM

## 2024-05-10 RX ORDER — DIPHENHYDRAMINE HCL 50 MG
50 CAPSULE ORAL ONCE
Qty: 1 CAPSULE | Refills: 0 | Status: SHIPPED | OUTPATIENT
Start: 2024-05-10 | End: 2024-05-10

## 2024-05-10 RX ORDER — METHYLPREDNISOLONE 32 MG/1
TABLET ORAL
Qty: 2 TABLET | Refills: 0 | Status: SHIPPED | OUTPATIENT
Start: 2024-05-10

## 2024-06-14 ENCOUNTER — TELEPHONE (OUTPATIENT)
Dept: CARDIAC SURGERY | Facility: CLINIC | Age: 84
End: 2024-06-14
Payer: MEDICARE

## 2024-06-14 NOTE — TELEPHONE ENCOUNTER
Kiah with [facility] radiology called to report that patient was there at [facility] to have CTA abd./pelvis today and has a contrast allergy. He was not premedicated. I told Kiah that he will need to be rescheduled and pre-medicated.

## 2024-06-14 NOTE — TELEPHONE ENCOUNTER
Caller: Cuauhtemoc Blanchard    Relationship to patient: Self    Best call back number: [phone]     Chief complaint: PATIENT HAS TO RESCHEDULE TESTING DUE TO AN ALLERGY.     PATIENT STATES THAT IF PRE MEDICATION IS REQUIRED [facility]     Type of visit: FOLLOW UP WITH TESTING     Requested date: PATIENT CAN NOT COME ON 6/24/24 AND WOULD LIKE AFTERNOON APPOINTMENTS.      If rescheduling, when is the original appointment: 6/14/24     Additional notes:PATIENT WOULD LIKE A CALL BACK.

## 2024-06-17 ENCOUNTER — TELEPHONE (OUTPATIENT)
Dept: CARDIAC SURGERY | Facility: CLINIC | Age: 84
End: 2024-06-17

## 2024-06-17 NOTE — TELEPHONE ENCOUNTER
Caller: KASSANDRA MOSCOSO    Relationship: Provider    Best call back number:     [phone]   [facility] (DO NOT NEED TO CALL BACK)    What is the best time to reach you: ANY    Who are you requesting to speak with (clinical staff, provider,  specific staff member): ANY    Do you know the name of the person who called: N/A    What was the call regarding: KASSANDRA FROM [facility] CALLED AND SAID PT ARRIVED FOR CTA BUT HE IS ALLERGIC TO DYE SO THEY NEED MEDS TO BE CALLED IN FOR PT.     THEY SCHEDULED PT OUT ABOUT 3 WEEKS FROM 6/17/24.     PLEASE CALL PT ONCE MEDS HAVE BEEN CALLED IN TO PHARMACY.     Is it okay if the provider responds through [facility] [employer]: NO

## 2024-06-18 DIAGNOSIS — I71.43 INFRARENAL ABDOMINAL AORTIC ANEURYSM (AAA) WITHOUT RUPTURE: Primary | ICD-10-CM

## 2024-06-21 DIAGNOSIS — R91.8 PULMONARY NODULES/LESIONS, MULTIPLE: Primary | ICD-10-CM

## 2024-06-21 RX ORDER — DIPHENHYDRAMINE HCL 50 MG
50 CAPSULE ORAL ONCE
Qty: 1 CAPSULE | Refills: 0 | Status: CANCELLED | OUTPATIENT
Start: 2024-06-21 | End: 2024-06-21

## 2024-06-21 RX ORDER — METHYLPREDNISOLONE 32 MG/1
TABLET ORAL
Qty: 2 TABLET | Refills: 0 | Status: CANCELLED | OUTPATIENT
Start: 2024-06-21

## 2024-07-23 DIAGNOSIS — I71.43 INFRARENAL ABDOMINAL AORTIC ANEURYSM (AAA) WITHOUT RUPTURE: Primary | ICD-10-CM

## 2024-07-23 DIAGNOSIS — R91.1 LUNG NODULE: ICD-10-CM

## 2024-07-23 DIAGNOSIS — I71.30 RUPTURED ABDOMINAL AORTIC ANEURYSM (AAA), UNSPECIFIED PART: ICD-10-CM

## 2024-07-31 DIAGNOSIS — R91.1 LUNG NODULE: ICD-10-CM

## 2024-07-31 DIAGNOSIS — I71.43 INFRARENAL ABDOMINAL AORTIC ANEURYSM (AAA) WITHOUT RUPTURE: ICD-10-CM

## 2024-08-08 NOTE — PROGRESS NOTES
[facility] Cardiothoracic Surgery Office Follow Up Note     Date of Encounter: 2024     Name: Cuauhtemoc Blanchard  : 1940     Referred By: No ref. provider found  PCP: Isaías Thornton MD    Chief Complaint:    Chief Complaint   Patient presents with    Aortic Aneurysm     1 year follow up for an abdominal aortic aneurysm.       Subjective      History of Present Illness:    Cuauhtemoc Blanchard is a 84 y.o. male former smoker with history of oxygen dependent COPD, inflammatory pulmonary nodules, HTN, HLD on statin therapy, non-insulin-dependent DM, CAD s/p CABG, VHD s/p aortic valve replacement 2017 at [facility] on Coumadin therapy, and AAA s/p EVAR in  by Dr. Duarte.  Surveillance imaging from 2021 with increase in native aneurysmal sac from 5.1 cm to almost 6 cm with concern for type II endoleak s/p angiogram with embolization with several coils of right L4 lumbar radicular artery on 2021 with Dr Huddleston.   Pt presents today for his annual EVAR surveillance. He is having no abdominal trouble and has good blood pressure control.     Of note patient has had a history of pulmonary nodules as far back as , non-hypermetabolic on 3/2024 PET and though to be inflammatory. His surveillance CT abdomen picked up a LLL infiltrate and enlarging nodule. It appears that patient had CT-guided biopsy of left lower lobe 10/4/2022 at [place].  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  AFB and GMS stains are negative; negative for malignancy.  Apparently he was referred to a black lung specialist who told him there was nothing they could do to help him. He does not wish to return to a doctor who cannot help him.   He is complaining of sore throat, coughing after eating, and something getting hung up in his throat. He denies hemoptysis.     Review of Systems:  Review of Systems    Constitutional: Positive for malaise/fatigue. Negative for chills, decreased appetite, diaphoresis, fever, night sweats, weight gain and weight loss.   HENT:  Positive for sore throat. Negative for hoarse voice.    Eyes:  Negative for blurred vision, double vision and visual disturbance.   Cardiovascular:  Positive for leg swelling. Negative for chest pain, claudication, dyspnea on exertion, irregular heartbeat, near-syncope, orthopnea, palpitations, paroxysmal nocturnal dyspnea and syncope.   Respiratory:  Positive for cough and shortness of breath. Negative for hemoptysis, sputum production and wheezing.    Hematologic/Lymphatic: Negative for adenopathy and bleeding problem. Bruises/bleeds easily.   Skin:  Positive for color change. Negative for nail changes, poor wound healing and rash.   Musculoskeletal:  Positive for joint pain and muscle cramps. Negative for back pain and falls.   Gastrointestinal:  Positive for dysphagia. Negative for abdominal pain and heartburn.   Genitourinary:  Negative for flank pain.   Neurological:  Positive for dizziness and loss of balance. Negative for brief paralysis, disturbances in coordination, focal weakness, headaches, light-headedness, numbness, paresthesias, sensory change, vertigo and weakness.   Psychiatric/Behavioral:  Negative for depression and suicidal ideas.    Allergic/Immunologic: Negative for persistent infections.       I have reviewed the following portions of the patient's history: problem list, current medications, allergies, past surgical history, past medical history, past social history, past family history, and ROS and confirm it's accurate.    Allergies:  Allergies   Allergen Reactions    Dabigatran Other (See Comments)     GI Bleed    Contrast Dye (Echo Or Unknown Ct/Mr) Unknown - High Severity    Pradaxa [Dabigatran Etexilate Mesylate] Other (See Comments)     GI bleed    Iodixanol Rash    Povidone Iodine Rash and Hives     Bleeding        Medications:       Current Outpatient Medications:     albuterol (PROVENTIL) (2.5 MG/3ML) 0.083% nebulizer solution, 2.5 mg 3 (Three) Times a Day., Disp: , Rfl:     aspirin 81 MG chewable tablet, Chew 1 tablet., Disp: , Rfl:     atorvastatin (LIPITOR) 20 MG tablet, Take 2 tablets by mouth Daily., Disp: , Rfl:     carvedilol (COREG) 25 MG tablet, Take 1 tablet by mouth 2 (Two) Times a Day., Disp: , Rfl:     Cholecalciferol (Vitamin D3) 50 MCG (2000 UT) tablet, Take  by mouth Daily., Disp: , Rfl:     dofetilide (TIKOSYN) 250 MCG capsule, Take 1 capsule by mouth 2 (Two) Times a Day., Disp: , Rfl:     famotidine (PEPCID) 40 MG tablet, Take 1 tablet by mouth Daily., Disp: , Rfl:     FERROUS SULFATE PO, Take  by mouth Daily., Disp: , Rfl:     finasteride (PROSCAR) 5 MG tablet, Take 1 tablet by mouth Daily., Disp: , Rfl:     glipiZIDE (GLUCOTROL) 5 MG tablet, Take 0.5 tablets by mouth 2 (Two) Times a Day Before Meals., Disp: , Rfl:     methylPREDNISolone (Medrol) 32 MG tablet, 1 tablet 12 hours prior to exam and 1 tablet 2 hours prior to exam, Disp: 2 tablet, Rfl: 0    potassium chloride (K-DUR) 10 MEQ CR tablet, Take 2 tablets by mouth 2 (Two) Times a Day., Disp: , Rfl:     torsemide (DEMADEX) 20 MG tablet, Take 1 tablet by mouth Daily., Disp: , Rfl:     warfarin (COUMADIN) 7.5 MG tablet, Take 1 tablet by mouth Daily. Mon, Wed, Friday-7mg- Tue, Thur, Sat  and Sunday-6mg, Disp: , Rfl:     History:   Past Medical History:   Diagnosis Date    Aneurysm     Aneurysm of abdominal aorta     Arrhythmia     Benign neoplasm of skin     CAD (coronary artery disease)     Diabetes mellitus     H/O: varicose veins     Hypertension     Lung nodule     Neurogenic bladder     Pneumonia        Past Surgical History:   Procedure Laterality Date    ABDOMINAL AORTIC ANEURYSM REPAIR WITH ENDOGRAFT  03/28/2014    CARDIAC VALVE REPLACEMENT      CHOLECYSTECTOMY      CORONARY ARTERY BYPASS GRAFT      KEARNEY CATHETER      INTERVENTIONAL RADIOLOGY PROCEDURE N/A  "2021    Procedure: angiogram and possible embolization AAA endoleak;  Surgeon: Carmelo Huddleston MD;  Location: [facility] CATH INVASIVE LOCATION;  Service: Interventional Radiology;  Laterality: N/A;       Social History     Socioeconomic History    Marital status:     Number of children: 3   Tobacco Use    Smoking status: Former     Current packs/day: 0.00     Average packs/day: 0.8 packs/day for 30.0 years (22.5 ttl pk-yrs)     Types: Cigarettes     Start date:      Quit date:      Years since quittin.6    Smokeless tobacco: Never   Vaping Use    Vaping status: Never Used   Substance and Sexual Activity    Alcohol use: No    Drug use: No        Family History   Problem Relation Age of Onset    Coronary artery disease Mother     Diabetes Mother     Other Father        Objective   Physical Exam:  Vitals:    24 1418 24 1419   BP: 103/53 114/60   BP Location: Left arm Right arm   Patient Position: Sitting Sitting   Pulse: 69    Temp: 97.1 °F (36.2 °C)    SpO2: 97%    Weight: 103 kg (226 lb)    Height: 182.9 cm (72\")  Comment: patient reports       Body mass index is 30.65 kg/m².    Physical Exam  Vitals and nursing note reviewed.   Constitutional:       Appearance: Normal appearance. He is obese.      Interventions: He is not intubated.     Comments: wheelchair   Cardiovascular:      Rate and Rhythm: Normal rate.      Heart sounds: S1 normal and S2 normal.   Pulmonary:      Effort: Pulmonary effort is normal. Prolonged expiration present. No tachypnea, bradypnea, accessory muscle usage, respiratory distress or retractions. He is not intubated.      Breath sounds: Normal breath sounds and air entry. Examination of the left-upper field reveals wheezing. Examination of the right-lower field reveals rales. Examination of the left-lower field reveals rhonchi and rales.   Musculoskeletal:      Right lower le+ Edema present.      Left lower le+ Edema present.   Skin:     General: Skin " is warm and dry.   Neurological:      Mental Status: He is alert and oriented to person, place, and time. Mental status is at baseline.         Imaging/Labs:  CT Abdomen Pelvis Without Contrast (07/30/2024 12:35)   1.  No evidence of metastatic disease to the chest, abdomen or pelvis.   2.  Stable LEFT pleural thickening and calcifications with adjacent scarring.   3.  Stable abdominal aortic aneurysm status post endograft placement.   4.  Pandya catheter within urinary bladder. Mild thickening of the posterior wall of the urinary bladder appears to be present. Follow-up CT urogram recommended for further characterization.   5.  Patient is status post cholecystectomy.   6.  Relative enlargement of the pulmonary arteries is present, suggestive of pulmonary artery hypertension.   7.  Mild emphysematous changes noted. Scattered stable granuloma noted. No new or enlarging pulmonary nodules demonstrated.   Thank you for allowing us to assist in the care of this patient.   Electronically signed by:  Adolfo Burgos MD  07/31/2024 12:34 PM EDT RP Workstation: [id number]     CT abdomen pelvis angiogram w and/or wo IV contrast (06/06/2023 10:47)   1. Aortobiiliac stent grafts involving an aneurysm involving the infrarenal abdominal aorta measuring up to 6 cm grossly unchanged in terms of size. Stent appears patent.   2. Wall thickening and perivesical stranding involving the urinary bladder with Pandya catheter present may represent cystitis.   3. Left lung base airspace opacity similar to prior exam.   Report Sign Date: 6/6/2023 4:22 PM, Electronically Signed By: Hans Castro MD     CTA abdomen and pelvis ([facility])- 5/27/2022  There is redemonstration of abdominal aortic aneurysm repair with aortobiiliac stent.  The stent appears patent.  The aneurysm sac is unchanged measuring up to 6 cm.  Redemonstration of embolus material in the aneurysm sac.  No definite evidence of endoleak.  There is a Pandya catheter  noted and decompressed bladder.  There is diffuse thickening of the bladder wall with surrounding inflammation and stranding, could represent cystitis; correlate clinically.  Mild nodular contour of the liver could represent hepatic parenchymal disease.  Correlate clinically.  A 3.1 cm nodule in the left lower lobe with infiltrate and irregularity in the dependent left lower lobe; consider dedicated imaging of the chest for further evaluation.  Other chronic findings as described above     Discrepancy in body of radiology report measures this nodule as 2.1cm as opposed to 3.1cm that is listed in the impression.     CTA abdomen and pelvis ([facility])-Result date 12/3/2021  Redemonstration of the abdominal aortic aneurysm measuring up to 5 cm grossly unchanged in size compared to prior.  Aortobiiliac stent graft appears patent.  There is limited evaluation of the lumen due to embolism material.  Within these limits, there is no definitive evidence of endoleak.  There is thickening of the anterior wall of the bladder of uncertain significance may represent cystitis.  Redemonstration of ill-defined infiltrate or atelectasis in the lung base may represent partially visualized round atelectasis     CTA abdomen and pelvis ([facility])-Result date 5/19/2021  Aneurysmal dilatation of the infrarenal abdominal aorta measuring up to 5.7 cm with aortobiiliac stent in place.  No definite evidence of endoleak.  There is mild narrowing of the origin of the celiac axis.  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  Cirrhotic appearance of the liver with linear abnormalities in the mid abdomen possibly representing varices.  Minimal distention of the bladder with diffuse bladder wall thickening could be due to under distention, however cystitis could have similar appearance.  Nodular focus along the pleura of the left lung base measuring up to 7 cm with linear foci extending from the  region may represent round atelectasis.  Dedicated chest imaging could be considered for further evaluation.     Ultrasound aorta abdominal ([facility])-Result date 5/11/2021  Redemonstration of aneurysmal dilatation of the abdominal aorta now measuring up to 5.7 x 4.6 cm in the mid abdominal aorta and 6.1 cm distally.  There is no evidence of periaortic fluid collections.     Ultrasound aorta abdominal ([facility])-Result date 3/21/2019  The course and caliber of the abdominal aorta demonstrates fusiform dilatation of the distal abdominal aorta that measures a 10 maximal dimension of approximately 5.1 cm.  A stent graft is present appears patent.  The visualized portion of the aortic bifurcation the bilateral common iliac arteries is within normal limits.  There is no evidence of periaortic fluid collections.    Assessment / Plan      Assessment / Plan:  Diagnoses and all orders for this visit:    1. Infrarenal abdominal aortic aneurysm (AAA) without rupture s/p EVAR 2014 (Primary)  2. 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 Duarte.    5/2021 imaging with increase in native aneurysmal sac from 5.1 cm to almost 6 cm with concern for type II endoleak   S/p angiogram with embolization with several coils of right L4 lumbar radicular artery on 6/16/2021 with Dr Huddleston.  5/2022 surveillance imaging personally reviewed with continued measurement of ~ 5.7cm-6cm, no interval growth in native aneurismal sac. Difficult to ascertain about endoleak with coil artifact. Per radiology report no evidence of endoleak. With stability of native sac will return to annual surveillance (due 5/2023)  6/2023 Imaging demonstrates no signs of endoleak, native aneurysmal sac enlargement, migration of graft, or separation of device components. Native sac measuring 5.8cm which is stable if not smaller than 6cm measurement from last year   Annual surveillance  asymptomatic with appropriate BP control  CT imaging obtained without contrast this year so unable to speak to presence of endoleak but native sac appears stable at ~5.9-6.0cm   Would recommend continuing annual surveillance with CTA A/P.   Pt states he has lived a good life going on 84 years old and it is difficult for him to travel the 2+ hours to [place]. He wishes to speak with his PCP about continuing his surveillance   I will not schedule annual recall unless I hear back that he wants to continue     3. Pulmonary nodules/lesions, multiple  pulmonary nodules as far back as 2014 with Dr Duarte  PET scan 3/2014 showing these as large as 1.9cm non-hypermetabolic with Dr Duarte feeling they appear to be inflammatory nodules.   No dedicated CT imaging of his chest since 2016 5/2022 CTA abdomen picked up a LLL infiltrate and nodule that appears enlarged to 2.1-3.1cm (radiology discrepancy as above). On personal review this looks to be the same nodule as 2014 PET and measures 2.3cm  Dedicated CT chest 6/2022 personally reviewed with LML peripheral/lateral wall pleural density/thickening with central calcification. LLL infiltrative process with lobular mass that appears to be same nodule from PET although larger   NM PET ordered by TIMOTEO CASTILLO 8/2022 with avidity present and recommendations for tissue sampling  FNB ordered by TIMOTEO CASTILLO performed at [facility] 10/2022. 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  AFB and GMS stains are negative; negative for malignancy.  Referred to a black lung specialist but was told there is nothing that can be done and does not wish to follow-up with them  7/2024 CT chest radiology reports stable left plural thickening and calcifications and no new nodularities or enlarging nodules. However on personally review it does appear that pt has a new RML peripheral nodule    With Dr Duarte retiring I am recommending following with specialist or new referral to pulmonologist. Again pt states he has lived a good life and now at 84 he does not wish to continue with surveillance or to see a new specialist    Follow Up:   Return for PRN: on as needed basis.   Or sooner for any further concerns or worsening sign and symptoms. If unable to reach us in the office please dial 911 or go to the nearest emergency department.      SG Cormier  [facility] Cardiothoracic Surgery      Time Spent: I spent 37 minutes caring for Cuauhtemoc on this date of service. This time includes time spent by me in the following activities: preparing for the visit, reviewing tests, obtaining and/or reviewing a separately obtained history, performing a medically appropriate examination and/or evaluation, counseling and educating the patient/family/caregiver, ordering medications, tests, or procedures, referring and communicating with other health care professionals, documenting information in the medical record, independently interpreting results and communicating that information with the patient/family/caregiver, and care coordination.

## 2024-08-12 ENCOUNTER — OFFICE VISIT (OUTPATIENT)
Dept: CARDIAC SURGERY | Facility: CLINIC | Age: 84
End: 2024-08-12
Payer: MEDICARE

## 2024-08-12 ENCOUNTER — TELEPHONE (OUTPATIENT)
Dept: CARDIAC SURGERY | Facility: CLINIC | Age: 84
End: 2024-08-12

## 2024-08-12 VITALS
BODY MASS INDEX: 30.61 KG/M2 | SYSTOLIC BLOOD PRESSURE: 114 MMHG | WEIGHT: 226 LBS | DIASTOLIC BLOOD PRESSURE: 60 MMHG | HEART RATE: 69 BPM | TEMPERATURE: 97.1 F | HEIGHT: 72 IN | OXYGEN SATURATION: 97 %

## 2024-08-12 DIAGNOSIS — R91.8 PULMONARY NODULES/LESIONS, MULTIPLE: ICD-10-CM

## 2024-08-12 DIAGNOSIS — I97.89 TYPE II ENDOLEAK OF AORTIC GRAFT: ICD-10-CM

## 2024-08-12 DIAGNOSIS — I71.43 INFRARENAL ABDOMINAL AORTIC ANEURYSM (AAA) WITHOUT RUPTURE: Primary | ICD-10-CM

## 2024-08-12 PROCEDURE — 99214 OFFICE O/P EST MOD 30 MIN: CPT | Performed by: NURSE PRACTITIONER

## 2024-08-12 PROCEDURE — 1159F MED LIST DOCD IN RCRD: CPT | Performed by: NURSE PRACTITIONER

## 2024-08-12 PROCEDURE — 1160F RVW MEDS BY RX/DR IN RCRD: CPT | Performed by: NURSE PRACTITIONER

## 2024-08-12 NOTE — TELEPHONE ENCOUNTER
Lvm to pt stating we had openings today earlier than his 3pm appt and wanted to see if he wanted to come in at like 1:30 or 2. Told pt to call our office if he would like to.

## 2024-09-06 ENCOUNTER — TELEPHONE (OUTPATIENT)
Dept: CARDIAC SURGERY | Facility: CLINIC | Age: 84
End: 2024-09-06
Payer: MEDICARE

## 2024-09-06 NOTE — TELEPHONE ENCOUNTER
Caller: Cuauhtemoc Blanchard    Relationship to patient: Self    Best call back number:     [phone]        Chief complaint: LAST OFFICE NOTE STATES RETURN AS NEEDED.     Type of visit: 1YR FOLLOW UP     Requested date: AUGUST 2025     PATIENT PREFERS [facility].     If rescheduling, when is the original appointment: LAST SEEN AUGUST 2024     Additional notes:PATIENT WOULD LIKE A CALL BACK.

## 2024-09-06 NOTE — TELEPHONE ENCOUNTER
SPOKE WITH PT AND PT SENT A MESSAGE THROUGH [facility] WANTING A F/U FOR AUGUST '25. I MADE HIM AN APPT FOR 8/7/25 BUT LAST NOTES OF CC SAY PRN OR AS NEEDED BASIS.

## 2024-12-17 ENCOUNTER — TELEPHONE (OUTPATIENT)
Dept: CARDIAC SURGERY | Facility: CLINIC | Age: 84
End: 2024-12-17

## 2024-12-17 NOTE — TELEPHONE ENCOUNTER
“Please be informed that patient has passed. Patient has been marked  in the system. The date of death is: 24.    Caller: [facility]     Relationship: PROVIDER    Best call back number: [phone]    Did the patient have surgery within 30 days of their passing (Y/N): N

## (undated) DEVICE — STPCK 3/WY HP M/RA W/OFF/HNDL 1050PSI STRL

## (undated) DEVICE — STPCK LP 1WY RA 200PSI

## (undated) DEVICE — CATH TEMPO 5F SIM 2 100CM: Brand: TEMPO

## (undated) DEVICE — LEX NEURO ANGIOGRAPHY: Brand: MEDLINE INDUSTRIES, INC.

## (undated) DEVICE — ST ACC MICROPUNCTURE .018 TRANSLSS/SS/TP 5F/10CM 21G/7CM

## (undated) DEVICE — LN INJ CONTRST FLXCIL HP F/M LL 1200PSI48

## (undated) DEVICE — Device

## (undated) DEVICE — BALN SFT VU COBRA2 .035IN 5F 65CM

## (undated) DEVICE — RADIFOCUS GLIDEWIRE: Brand: GLIDEWIRE

## (undated) DEVICE — GUIDEWIRE WITH ICE™ HYDROPHILIC COATING: Brand: TRANSEND™ EX

## (undated) DEVICE — DETACH COIL INSNT

## (undated) DEVICE — INTRO SHEATH ART/FEM ENGAGE .038 5F12CM

## (undated) DEVICE — NEURO GUIDEWIRE WITH HYDROPHILIC COATING: Brand: SYNCHRO 14

## (undated) DEVICE — ANGIO-SEAL VIP VASCULAR CLOSURE DEVICE: Brand: ANGIO-SEAL

## (undated) DEVICE — 2 TIP PRE-SHAPED 45 MICROCATHETER: Brand: EXCELSIOR SL-10